# Patient Record
Sex: FEMALE | Race: WHITE | NOT HISPANIC OR LATINO | URBAN - METROPOLITAN AREA
[De-identification: names, ages, dates, MRNs, and addresses within clinical notes are randomized per-mention and may not be internally consistent; named-entity substitution may affect disease eponyms.]

---

## 2023-04-04 ENCOUNTER — TELEPHONE (OUTPATIENT)
Dept: PRIMARY CARE | Facility: CLINIC | Age: 73
End: 2023-04-04
Payer: MEDICARE

## 2023-04-04 NOTE — TELEPHONE ENCOUNTER
Pt called and states that she have another sinus infection with her ear popping, congestion,cheeks hurt, headache and no fever it started yesterday and want to know if you can do a vv tomorrow?

## 2023-04-05 ENCOUNTER — TELEMEDICINE (OUTPATIENT)
Dept: PRIMARY CARE | Facility: CLINIC | Age: 73
End: 2023-04-05
Payer: MEDICARE

## 2023-04-05 DIAGNOSIS — J32.8 OTHER CHRONIC SINUSITIS: Primary | ICD-10-CM

## 2023-04-05 PROBLEM — J30.9 ALLERGIC RHINITIS: Status: ACTIVE | Noted: 2023-04-05

## 2023-04-05 PROBLEM — J34.2 DEVIATED NASAL SEPTUM: Status: ACTIVE | Noted: 2023-04-05

## 2023-04-05 PROBLEM — M81.0 OSTEOPOROSIS: Status: ACTIVE | Noted: 2023-04-05

## 2023-04-05 PROBLEM — E78.5 HYPERLIPIDEMIA: Status: ACTIVE | Noted: 2023-04-05

## 2023-04-05 PROBLEM — R09.82 POST-NASAL DRAINAGE: Status: ACTIVE | Noted: 2023-04-05

## 2023-04-05 PROBLEM — R26.89 LIMP: Status: ACTIVE | Noted: 2023-04-05

## 2023-04-05 PROBLEM — S93.401A INVERSION SPRAIN OF RIGHT ANKLE: Status: ACTIVE | Noted: 2023-04-05

## 2023-04-05 PROBLEM — K64.9 HEMORRHOIDS: Status: ACTIVE | Noted: 2023-04-05

## 2023-04-05 PROBLEM — S92.353A FRACTURE OF FIFTH METATARSAL BONE: Status: ACTIVE | Noted: 2023-04-05

## 2023-04-05 PROBLEM — N81.6 RECTOCELE, FEMALE: Status: ACTIVE | Noted: 2023-04-05

## 2023-04-05 PROBLEM — J32.2 CHRONIC ETHMOIDAL SINUSITIS: Status: ACTIVE | Noted: 2023-04-05

## 2023-04-05 PROBLEM — R94.31 ABNORMAL EKG: Status: ACTIVE | Noted: 2023-04-05

## 2023-04-05 PROBLEM — J34.89 NASAL CAVITY MASS: Status: ACTIVE | Noted: 2023-04-05

## 2023-04-05 PROBLEM — J34.89 SINUS PRESSURE: Status: ACTIVE | Noted: 2023-04-05

## 2023-04-05 PROBLEM — N62 MACROMASTIA: Status: ACTIVE | Noted: 2023-04-05

## 2023-04-05 PROBLEM — M25.571 RIGHT ANKLE PAIN: Status: ACTIVE | Noted: 2023-04-05

## 2023-04-05 PROBLEM — E78.00 PURE HYPERCHOLESTEROLEMIA: Status: ACTIVE | Noted: 2023-04-05

## 2023-04-05 PROBLEM — M79.671 RIGHT FOOT PAIN: Status: ACTIVE | Noted: 2023-04-05

## 2023-04-05 PROBLEM — N36.2 URETHRAL CARUNCLE: Status: ACTIVE | Noted: 2023-04-05

## 2023-04-05 PROBLEM — J30.1 ALLERGIC RHINITIS DUE TO POLLEN: Status: ACTIVE | Noted: 2023-04-05

## 2023-04-05 PROBLEM — N95.2 ATROPHIC VAGINITIS: Status: ACTIVE | Noted: 2023-04-05

## 2023-04-05 PROCEDURE — 99213 OFFICE O/P EST LOW 20 MIN: CPT | Performed by: INTERNAL MEDICINE

## 2023-04-05 RX ORDER — AZITHROMYCIN 250 MG/1
TABLET, FILM COATED ORAL
Qty: 12 TABLET | Refills: 0 | Status: SHIPPED | OUTPATIENT
Start: 2023-04-05

## 2023-04-05 RX ORDER — ESTRADIOL 0.1 MG/G
CREAM VAGINAL
COMMUNITY
Start: 2022-05-24 | End: 2023-04-05 | Stop reason: ALTCHOICE

## 2023-04-05 ASSESSMENT — ENCOUNTER SYMPTOMS
DIARRHEA: 0
COUGH: 0
HEADACHES: 1
RHINORRHEA: 0
SORE THROAT: 1
NECK PAIN: 0
ABDOMINAL PAIN: 0
VOMITING: 0

## 2023-04-05 NOTE — PROGRESS NOTES
Answers submitted by the patient for this visit:  Ear Pain Questionnaire (Submitted on 4/5/2023)  Chief Complaint: Ear pain  Affected ear: both  Chronicity: new  Onset: in the past 7 days  Progression since onset: unchanged  Fever: no fever  Pain - numeric: 2/10  abdominal pain: No  ear discharge: No  rash: No  cough: No  headaches: Yes  rhinorrhea: No  diarrhea: No  hearing loss: No  sore throat: Yes  neck pain: No  vomiting: No

## 2023-04-05 NOTE — PROGRESS NOTES
Subjective   Patient ID: Taryn Brar is a 72 y.o. female who presents for Sinusitis and ears pain.    HPI   Symptoms of sinusitis. Did not get completely better last time, one month ago.  Then symptoms got worse a few days ago  Mucus is yellow  Hot and cold  Frontal headaches, and now right ear pressure too  No cp sob      negative otherwise    Objective   There were no vitals taken for this visit.  pt appears well.  No acute distress or resp distress  Patient is alert and orientated three.  mood and affection appropriate      Assessment/Plan   Diagnoses and all orders for this visit:  Other chronic sinusitis  -     azithromycin (Zithromax) 250 mg tablet; Take two po today then one po qd  Continue flnoase and robitussin  Follow up if not better

## 2023-08-23 ENCOUNTER — TELEPHONE (OUTPATIENT)
Dept: PRIMARY CARE | Facility: CLINIC | Age: 73
End: 2023-08-23
Payer: MEDICARE

## 2023-08-23 NOTE — TELEPHONE ENCOUNTER
Pt lvm requesting to cancel appt with Dr. Bonilla    Spoke with pt and gave her dr. Bonilla number

## 2023-10-27 ENCOUNTER — OFFICE VISIT (OUTPATIENT)
Dept: DERMATOLOGY | Facility: CLINIC | Age: 73
End: 2023-10-27
Payer: MEDICARE

## 2023-10-27 VITALS — HEART RATE: 80 BPM | SYSTOLIC BLOOD PRESSURE: 160 MMHG | DIASTOLIC BLOOD PRESSURE: 80 MMHG

## 2023-10-27 DIAGNOSIS — C44.311 BASAL CELL CARCINOMA (BCC) OF SKIN OF NOSE: ICD-10-CM

## 2023-10-27 PROCEDURE — 13151 CMPLX RPR E/N/E/L 1.1-2.5 CM: CPT | Performed by: DERMATOLOGY

## 2023-10-27 PROCEDURE — 17312 MOHS ADDL STAGE: CPT | Performed by: DERMATOLOGY

## 2023-10-27 PROCEDURE — 17311 MOHS 1 STAGE H/N/HF/G: CPT | Performed by: DERMATOLOGY

## 2023-10-27 PROCEDURE — 99214 OFFICE O/P EST MOD 30 MIN: CPT | Performed by: DERMATOLOGY

## 2023-10-27 PROCEDURE — 1126F AMNT PAIN NOTED NONE PRSNT: CPT | Performed by: DERMATOLOGY

## 2023-10-27 PROCEDURE — 1036F TOBACCO NON-USER: CPT | Performed by: DERMATOLOGY

## 2023-10-27 PROCEDURE — 1159F MED LIST DOCD IN RCRD: CPT | Performed by: DERMATOLOGY

## 2023-10-27 NOTE — PROGRESS NOTES
Office Visit Note  Date: 10/27/2023  Surgeon:  Vicky Mckinley MD  Office Location: [address]  [facility] [address]  Dept: [phone]  Dept Fax: [phone]  Referring Provider: Francis Nicole MD  [address] [place] [address] Dr Reji Rios [address], [place] [address]  [place] [id number]    Subjective   Taryn Brar is a 73 y.o. female who presents for the following: MOHS Surgery (Left Lateral Nasal Dorsum - Basal Cell Carcinoma)    According to the patient, the lesion has been presnt for approximately 1 month at the time of diagnosis.  The lesion is not causing symptoms.  The lesion has not been treated previously.    The patient does not have a pacemaker / defibrillator.    The patient is not on blood thinners.  The patient does not have a history of hepatitis B or C.  The patient does not have a history of HIV.    Review of Systems:  No other skin or systemic complaints other than what is documented elsewhere in the note.    MEDICAL HISTORY: clinically relevant history including significant past medical history, medications and allergies was reviewed and documented in Epic.    Objective   Well appearing patient in no apparent distress; mood and affect are within normal limits.  Vital signs: See record.  Noted on the Left Nasal Dorsum  Is a 0.5 x 0.5 cm scar    The patient confirmed the identified site.    Discussion:  The nature of the diagnosis was explained. The lesion is a skin cancer.  It has a risk of local growth and distant spread. The condition is associated with sun exposure.  Warning signs of non-melanoma skin cancer discussed. Patient was instructed to perform monthly self skin examination.  We recommended that the patient have regular full skin exams given an increased risk of subsequent skin cancers. The patient was instructed to use sun protective behaviors including use of broad spectrum sunscreens and sun protective clothing to reduce risk of skin cancers.       Risks, benefits, side effects of Mohs surgery were discussed with patient and the patient voiced understanding.  It was explained that even though the cure rate of Mohs is very high it is not 100%. Risks of surgery including but not limited to bleeding, infection, numbness, nerve damage, and scar were reviewed.  Discussion included wound care requirements, activity restrictions, likely scar outcome and time to heal.     After Mohs surgery, the defect may need to be repaired surgically and the scar may be longer than the original lesion.  Reconstruction options, risks, and benefits were reviewed including second intention healing, linear repair (4-1 ratio was explained), local flaps, skin grafts, cartilage grafts and interpolation flaps (the need for multiple surgeries was explained). Possible outcomes were reviewed including likely scar appearance, failure of flap survival, infection, bleeding and the need for revision surgery.     The pathology was reviewed, the photograph was reviewed, and the referring physician's note was reviewed.    Patient elected for Mohs surgery.

## 2023-10-27 NOTE — PROGRESS NOTES
Mohs Surgery Operative Note    Date of Surgery:  10/27/2023  Surgeon:  Vicky Mckinley MD  Office Location: [address]   [facility] [address]  [facility] [address]  Dept: [phone]  Dept Fax: [phone]  Referring Provider: Francis Nicole MD  [address] [place] [address]  [place] [address]      Assessment/Plan   Pre-procedure:   Obtained informed consent: written from patient  The surgical site was identified and confirmed with the patient.     Intra-operative:   Audible time out called at : 9:20 AM 10/27/23  by: Jana Villanueva LPN   Verified patient name, birthdate, site, specimen bottle label & requisition.    The planned procedure(s) was again reviewed with the patient. The risks of bleeding, infection, nerve damage and scarring were reviewed. Written authorization was obtained. The patient identity, surgical site, and planned procedure(s) were verified. The provider acted as both surgeon and pathologist.     Basal cell carcinoma (BCC) of skin of nose  Left Nasal Dorsum  Mohs surgery  Consent obtained: written    Universal Protocol:  Procedure explained and questions answered to patient or proxy's satisfaction: Yes    Test results available and properly labeled: Yes    Pathology report reviewed: Yes    External notes reviewed: Yes    Photo or diagram used for site identification: Yes    Site/side marked: Yes    Slide independently reviewed by Mohs surgeon: Yes    Immediately prior to procedure a time out was called: Yes    Patient identity confirmed: verbally with patient  Preparation: Patient was prepped and draped in usual sterile fashion      Anticoagulation:  Is the patient taking prescription anticoagulant and/or aspirin prescribed/recommended by a physician? No    Was the anticoagulation regimen changed prior to Mohs? No      Anesthesia:  Anesthesia method: local infiltration  Local anesthetic: lidocaine 2% WITH epi    Procedure Details:  Biopsy  accession number: [id number]  Date of biopsy: 7/20/2023  Pre-Op diagnosis: basal cell carcinoma  BCC subtype: nodular  Surgery side: left  Surgical site (from skin exam): Left Nasal Dorsum  Pre-operative length (cm): 0.5  Pre-operative width (cm): 0.5  Indications for Mohs surgery: anatomic location where tissue conservation is critical    Micrographic Surgery Details:  Post-operative length (cm): 0.8  Post-operative width (cm): 0.8  Number of Mohs stages: 2    Stage 1     Comments: The patient was brought into the operating room and placed in the procedure chair in the appropriate position.  The area positive by previous biopsy was identified and confirmed with the patient. The area of clinically obvious tumor was debulked using a curette and/or scalpel as needed. An incision was made following the Mohs approach through the skin. The specimen was taken to the lab, divided into 2 piece(s) and appropriately chromacoded and processed.  Tumor was seen on the lateral margins as indicated on the on the Mohs map.  Nodular basal cell carcinoma. Histologic examination revealed large tumor aggregates of atypical basaloid cells with peripheral palisading and tumoral clefting.      Tumor features identified on Mohs section: basal carcinoma      Depth of invasion: dermis    Stage 2     Comments: The area of positivity as noted on the Mohs map in the previous stage was identified and removed using the Mohs technique. The specimen was taken to the lab and appropriately chromacoded and processed in 1 piece(s).     Tumor features identified on Mohs section: no tumor identified  Depth of defect: subcutaneous fat    Patient tolerance of procedure: tolerated well, no immediate complications    Reconstruction:  Was the defect reconstructed? Yes    Was reconstruction performed by the same Mohs surgeon? Yes    Setting of reconstruction: outpatient office  When was reconstruction performed? same day  Type of reconstruction: linear  Linear  reconstruction: complex  Subcutaneous Layers (Deep Stitches)   Suture size:  5-0  Suture type:  Vicryl  Stitches:  Buried vertical mattress  Fine/surface layer approximation (top stitches)   Epidermal/Superficial suture size:  5-0  Epidermal/Superficial suture type:  Fast-absorbing gut  Stitches: simple running    Hemostasis achieved with: suture, pressure and electrodesiccation  Outcome: patient tolerated procedure well with no complications    Post-procedure details: wound care instructions given      Staff Communication: Dermatology Local Anesthesia: Site Location: Left Lateral Nasal Dorsum 2 % Lidocaine / Epinephrine - Amount: 3.0 cc    Complex Linear Repair - Wide Undermining:  Given the location and size of the defect, it was determined that a complex layered linear closure was required to restore normal anatomy and function. The repair was considered complex because extensive undermining was required and performed. The amount of undermining performed was greater than the maximum width of the defect as measured perpendicular to the closure line along at least one entire edge of the defect. Standing cutaneous cones were removed using Burow's triangles. The wound edges were brought into close approximation with buried vertical mattress sutures. The remainder of the wound was then closed with epidermal top sutures.    The final repair measured 1.5 cm                  Wound care was discussed, and the patient was given written post-operative wound care instructions.      The patient will follow up with Vicky Mckinley MD as needed for any post operative problems or concerns, and will follow up with their primary dermatologist as scheduled.

## 2024-01-07 ASSESSMENT — DERMATOLOGY QUALITY OF LIFE (QOL) ASSESSMENT
RATE HOW BOTHERED YOU ARE BY EFFECTS OF YOUR SKIN PROBLEMS ON YOUR ACTIVITIES (EG, GOING OUT, ACCOMPLISHING WHAT YOU WANT, WORK ACTIVITIES OR YOUR RELATIONSHIPS WITH OTHERS): 0 - NEVER BOTHERED
ARE THERE EXCLUSIONS OR EXCEPTIONS FOR THE QUALITY OF LIFE ASSESSMENT: NO
WHAT SINGLE SKIN CONDITION LISTED BELOW IS THE PATIENT ANSWERING THE QUALITY-OF-LIFE ASSESSMENT QUESTIONS ABOUT: NONE OF THE ABOVE
RATE HOW BOTHERED YOU ARE BY EFFECTS OF YOUR SKIN PROBLEMS ON YOUR ACTIVITIES (EG, GOING OUT, ACCOMPLISHING WHAT YOU WANT, WORK ACTIVITIES OR YOUR RELATIONSHIPS WITH OTHERS): 0 - NEVER BOTHERED
RATE HOW EMOTIONALLY BOTHERED YOU ARE BY YOUR SKIN PROBLEM (FOR EXAMPLE, WORRY, EMBARRASSMENT, FRUSTRATION): 0 - NEVER BOTHERED
RATE HOW BOTHERED YOU ARE BY SYMPTOMS OF YOUR SKIN PROBLEM (EG, ITCHING, STINGING BURNING, HURTING OR SKIN IRRITATION): 1
WHAT SINGLE SKIN CONDITION LISTED BELOW IS THE PATIENT ANSWERING THE QUALITY-OF-LIFE ASSESSMENT QUESTIONS ABOUT: NONE OF THE ABOVE
RATE HOW EMOTIONALLY BOTHERED YOU ARE BY YOUR SKIN PROBLEM (FOR EXAMPLE, WORRY, EMBARRASSMENT, FRUSTRATION): 0 - NEVER BOTHERED
RATE HOW BOTHERED YOU ARE BY SYMPTOMS OF YOUR SKIN PROBLEM (EG, ITCHING, STINGING BURNING, HURTING OR SKIN IRRITATION): 1

## 2024-01-07 ASSESSMENT — DERMATOLOGY PATIENT ASSESSMENT
DO YOU HAVE ANY NEW OR CHANGING LESIONS: YES
WHERE ARE THESE NEW OR CHANGING LESIONS LOCATED: R LOWER BACK

## 2024-01-07 ASSESSMENT — PATIENT GLOBAL ASSESSMENT (PGA): WHAT IS THE PGA: PATIENT GLOBAL ASSESSMENT:  1 - CLEAR

## 2024-01-09 NOTE — PROGRESS NOTES
"Subjective     Taryn Brar is a 73 y.o. female who presents for the following: Skin Exam.  She notes a raised, scaly, itchy bump on her right abdomen, which rubs on her clothing and itches, but has not changed in any other way, including in size or color, and does not hurt or bleed.  She denies any other new, changing, or concerning skin lesions; no bleeding, itching, or burning lesions.      Review of Systems:  No other skin or systemic complaints other than what is documented elsewhere in the note.    The following portions of the chart were reviewed this encounter and updated as appropriate:       Skin Cancer History  No skin cancer on file.    Specialty Problems    None      Past Dermatologic / Past Relevant Medical History:    - history of BCC on left lateral nasal dorsum diagnosed 7/19/23 s/p Mohs surgery by Dr. Mckinley 10/27/23  - AKs  - reported history of \"pre-cancers\" on [facility] nose [facility]  - no h/o atypical nevi or melanoma     Family History:    No family history of melanoma or skin cancer    Social History:    The patient and her , who is a patient in our office as well, moved to [place] from [place], [place], to live near their daughter and son-in-law, who is a patient in our office as well, and she works part-time as a  at the [employer] in [place]    Allergies:  Patient has no known allergies.    Current Medications / CAM's:    Current Outpatient Medications:     cholecalciferol, vitamin D3, (VITAMIN D3 ORAL), Take by mouth., Disp: , Rfl:     azithromycin (Zithromax) 250 mg tablet, Take two po today then one po qd, Disp: 12 tablet, Rfl: 0    metroNIDAZOLE (Metrogel) 1 % gel, Apply twice daily to affected areas of face, Disp: 60 g, Rfl: 11     Objective   Well appearing patient in no apparent distress; mood and affect are within normal limits.    A full examination was performed including scalp, face, eyes, ears, nose, " lips, neck, chest, axillae, abdomen, back, bilateral upper extremities, and bilateral lower extremities. All findings within normal limits unless otherwise noted below.    Assessment/Plan   1. Neoplasm of uncertain behavior of skin (2)  right lower back  2 cm pink plaque with fine scale and ulceration          Lesion biopsy  Type of biopsy: tangential    Informed consent: discussed and consent obtained    Timeout: patient name, date of birth, surgical site, and procedure verified    Procedure prep:  Patient was prepped and draped  Anesthesia: the lesion was anesthetized in a standard fashion    Anesthetic:  1% lidocaine w/ epinephrine 1-100,000 local infiltration  Instrument used: DermaBlade    Hemostasis achieved with: aluminum chloride    Outcome: patient tolerated procedure well    Post-procedure details: sterile dressing applied and wound care instructions given    Dressing type: petrolatum and bandage      Staff Communication: Dermatology Local Anesthesia: 1 % Lidocaine / Epinephrine - Amount:    Specimen 1 - Dermatopathology- DERM LAB  Differential Diagnosis: bcc v other  Check Margins Yes/No?:    Comments:    Dermpath Lab: Routine Histopathology (formalin-fixed tissue)    lef medial mid back  1.8 cm pink patch          Lesion biopsy  Type of biopsy: tangential    Informed consent: discussed and consent obtained    Timeout: patient name, date of birth, surgical site, and procedure verified    Procedure prep:  Patient was prepped and draped  Anesthesia: the lesion was anesthetized in a standard fashion    Anesthetic:  1% lidocaine w/ epinephrine 1-100,000 local infiltration  Instrument used: DermaBlade    Hemostasis achieved with: aluminum chloride    Outcome: patient tolerated procedure well    Post-procedure details: sterile dressing applied and wound care instructions given    Dressing type: petrolatum and bandage      Staff Communication: Dermatology Local Anesthesia: 1 % Lidocaine / Epinephrine -  Amount:    Specimen 2 - Dermatopathology- DERM LAB  Differential Diagnosis: bcc v other  Check Margins Yes/No?:    Comments:    Dermpath Lab: Routine Histopathology (formalin-fixed tissue)    2. Actinic keratosis (2)  Mid Tip of Nose (2)  Scattered on the patient's mid nasal dorsum and right temple, there are erythematous, gritty, scaly macules     Actinic Keratoses -mid nasal dorsum and right temple.  The pre-cancerous nature of these lesions and treatment options were discussed with the patient today.  At this time, we recommend treatment with liquid nitrogen cryotherapy.  The patient expressed understanding, is in agreement with this plan, and wishes to proceed with cryotherapy today.    Destr of lesion - Mid Tip of Nose  Complexity: simple    Destruction method: cryotherapy    Informed consent: discussed and consent obtained    Lesion destroyed using liquid nitrogen: Yes    Cryotherapy cycles:  1  Outcome: patient tolerated procedure well with no complications    Post-procedure details: wound care instructions given      3. Inflamed seborrheic keratosis  Right Abdomen (side) - Lower  On the patient's right lateral abdomen, there is a 1.5 cm erythematous and light brown-colored, hyperkeratotic, stuck-on appearing plaque with a surrounding rim of erythema    Inflamed Seborrheic Keratosis - right lateral abdomen.  The benign nature of this lesion was discussed with the patient today and reassurance provided.  Given the history the patient provides of frequent irritation and associated symptoms as well as its inflamed appearance on exam today, we offered to treat this lesion with liquid nitrogen cryotherapy.  The patient expressed understanding, is in agreement with this plan, and wishes to proceed with cryotherapy today.    Destr of lesion - Right Abdomen (side) - Lower  Complexity: simple    Destruction method: cryotherapy    Informed consent: discussed and consent obtained    Lesion destroyed using liquid  nitrogen: Yes    Cryotherapy cycles:  2  Outcome: patient tolerated procedure well with no complications    Post-procedure details: wound care instructions given      4. Rosacea  Right Buccal Cheek  On the patient's face, most prominent over the bilateral medial cheeks and nose, there is moderate underlying erythema with several overlying telangiectasias and a few scattered erythematous, inflammatory papules     Rosacea - flare on face; papulo-pustular type.  The chronic and intermittently flaring nature of this condition and treatment options were discussed extensively with the patient today.  At this time, we recommend topical therapy with MetroGel 1%, which the patient was instructed to apply twice daily to the affected areas of the face.  We also discussed the various triggers of rosacea with the patient today, especially sun exposure, and emphasized the importance of trigger avoidance, particularly sun avoidance and sun protection with daily sunscreen use.  The risks, benefits, and side effects of this medication were discussed.  The patient expressed understanding and is in agreement with this plan.    metroNIDAZOLE (Metrogel) 1 % gel - Right Buccal Cheek  Apply twice daily to affected areas of face    5. History of nonmelanoma skin cancer  Left Nasal Sidewall  On the patient's left lateral nasal dorsum, there is a well-healed scar with no evidence of recurrent growth on exam today.    History of basal cell carcinoma and actinic keratoses and photodamage.  There is no evidence of recurrence at the site of the patient's recently treated BCC on her left lateral nasal dorsum on exam today.  The signs and symptoms of skin cancer were reviewed and the patient was advised to practice sun protection and sun avoidance, use daily sunscreen, and perform regular self skin exams.  We will have the patient return to our office in 6 months, pending the above biopsy results, for routine follow-up and skin exam, and the  patient was instructed to call our office should the patient notice any new, changing, symptomatic, or otherwise concerning skin lesions before then.  The patient expressed understanding and is in agreement with this plan.    6. Hemangioma of skin  Scattered on the patient's face, neck, trunk, and extremities, there are multiple small, round, cherry red- to purplish-colored, symmetric, uniform, vascular-appearing macules and papules    Cherry Angiomas - the benign nature of these vascular lesions was discussed with the patient today and reassurance provided.  No treatment is medically indicated for these lesions at this time.    7. Melanocytic nevus, unspecified location  Scattered on the patient's face, neck, trunk, and extremities, there are multiple small, round- to oval-shaped, brown-pigmented and pink-colored, symmetric, uniform-appearing macules and dome-shaped papules    Clinically benign- to slightly atypical-appearing nevi - the clinically benign- to slightly atypical-appearing nature of the patient's nevi was discussed with the patient today.  None of the patient's nevi meet threshold for biopsy today.  We emphasized the importance of performing monthly self-skin exams using the ABCDs of monitoring moles, which were reviewed with the patient today, as well as the importance of sun avoidance and sun protection with daily sunscreen use.  The patient expressed understanding and is in agreement with this plan.    8. Lentigo  Multiple tan- to light brown-colored, round- to oval-shaped, symmetric and uniform-appearing macules and small patches consistent with lentigines scattered in sun-exposed areas.    Solar Lentigines and photodamage.  The clinically benign-appearing nature of these lesions and their relation to chronic sun exposure were discussed with the patient today and reassurance provided.  None of these lesions meet threshold for biopsy today, and thus no treatment is medically indicated for these  lesions at this time.  The signs and symptoms of skin cancer were reviewed and the patient was advised to practice sun protection and sun avoidance, use daily sunscreen, and perform regular self skin exams.  The patient was instructed to monitor these lesions for any changes, such as in size, shape, or color, or associated symptoms and to call our office to schedule a return visit for re-evaluation if any such changes or symptoms are noticed in the future.  The patient expressed understanding and is in agreement with this plan.    9. Diffuse photodamage of skin  Photodistributed  Diffuse photodamage with actinic changes with telangiectasia and mottled pigmentation in sun-exposed areas.    Photodamage.  The signs and symptoms of skin cancer were reviewed and the patient was advised to practice sun protection and sun avoidance, use daily sunscreen, and perform regular self skin exams.  Sun protection was discussed, including avoiding the mid-day sun, wearing a sunscreen with SPF at least 50, and stressing the need for reapplication of sunscreen and applying more than they think they need.    10. Seborrheic keratosis  Scattered on the patient's face, neck, trunk, and extremities, there are multiple tan- to light brown-colored, hyperkeratotic, stuck-on appearing papules and small plaques of varying size and shape    Seborrheic Keratoses - the benign nature of these lesions was discussed with the patient today and reassurance provided.  No treatment is medically indicated for these lesions at this time.      I saw and evaluated the patient. I personally obtained the key and critical portions of the history and physical exam or was physically present for key and critical portions performed by the resident/fellow. I reviewed the resident/fellow's documentation and discussed the patient with the resident/fellow. I agree with the resident/fellow's medical decision making as documented in the note.    Francis Nicole MD

## 2024-01-10 ENCOUNTER — OFFICE VISIT (OUTPATIENT)
Dept: DERMATOLOGY | Facility: CLINIC | Age: 74
End: 2024-01-10
Payer: MEDICARE

## 2024-01-10 DIAGNOSIS — D18.01 HEMANGIOMA OF SKIN: ICD-10-CM

## 2024-01-10 DIAGNOSIS — L71.9 ROSACEA: ICD-10-CM

## 2024-01-10 DIAGNOSIS — L57.0 ACTINIC KERATOSIS: ICD-10-CM

## 2024-01-10 DIAGNOSIS — D48.5 NEOPLASM OF UNCERTAIN BEHAVIOR OF SKIN: Primary | ICD-10-CM

## 2024-01-10 DIAGNOSIS — Z85.828 HISTORY OF NONMELANOMA SKIN CANCER: ICD-10-CM

## 2024-01-10 DIAGNOSIS — L57.8 DIFFUSE PHOTODAMAGE OF SKIN: ICD-10-CM

## 2024-01-10 DIAGNOSIS — L82.0 INFLAMED SEBORRHEIC KERATOSIS: ICD-10-CM

## 2024-01-10 DIAGNOSIS — D22.9 MELANOCYTIC NEVUS, UNSPECIFIED LOCATION: ICD-10-CM

## 2024-01-10 DIAGNOSIS — L81.4 LENTIGO: ICD-10-CM

## 2024-01-10 DIAGNOSIS — L82.1 SEBORRHEIC KERATOSIS: ICD-10-CM

## 2024-01-10 PROCEDURE — 99214 OFFICE O/P EST MOD 30 MIN: CPT | Performed by: DERMATOLOGY

## 2024-01-10 PROCEDURE — 1126F AMNT PAIN NOTED NONE PRSNT: CPT | Performed by: DERMATOLOGY

## 2024-01-10 PROCEDURE — 88305 TISSUE EXAM BY PATHOLOGIST: CPT | Performed by: DERMATOLOGY

## 2024-01-10 PROCEDURE — 17003 DESTRUCT PREMALG LES 2-14: CPT | Performed by: DERMATOLOGY

## 2024-01-10 PROCEDURE — 17000 DESTRUCT PREMALG LESION: CPT

## 2024-01-10 PROCEDURE — 11103 TANGNTL BX SKIN EA SEP/ADDL: CPT

## 2024-01-10 PROCEDURE — 17110 DESTRUCTION B9 LES UP TO 14: CPT

## 2024-01-10 PROCEDURE — 1159F MED LIST DOCD IN RCRD: CPT | Performed by: DERMATOLOGY

## 2024-01-10 PROCEDURE — 11102 TANGNTL BX SKIN SINGLE LES: CPT

## 2024-01-10 PROCEDURE — 1036F TOBACCO NON-USER: CPT | Performed by: DERMATOLOGY

## 2024-01-10 PROCEDURE — 88305 TISSUE EXAM BY PATHOLOGIST: CPT | Mod: TC,DER | Performed by: DERMATOLOGY

## 2024-01-12 LAB
LABORATORY COMMENT REPORT: NORMAL
PATH REPORT.FINAL DX SPEC: NORMAL
PATH REPORT.GROSS SPEC: NORMAL
PATH REPORT.MICROSCOPIC SPEC OTHER STN: NORMAL
PATH REPORT.RELEVANT HX SPEC: NORMAL
PATH REPORT.TOTAL CANCER: NORMAL

## 2024-01-13 RX ORDER — METRONIDAZOLE 10 MG/G
GEL TOPICAL
Qty: 60 G | Refills: 11 | Status: SHIPPED | OUTPATIENT
Start: 2024-01-13

## 2024-01-18 ENCOUNTER — TELEPHONE (OUTPATIENT)
Dept: DERMATOLOGY | Facility: CLINIC | Age: 74
End: 2024-01-18
Payer: MEDICARE

## 2024-01-26 ENCOUNTER — TELEPHONE (OUTPATIENT)
Dept: PRIMARY CARE | Facility: CLINIC | Age: 74
End: 2024-01-26
Payer: MEDICARE

## 2024-04-05 ENCOUNTER — PROCEDURE VISIT (OUTPATIENT)
Dept: DERMATOLOGY | Facility: CLINIC | Age: 74
End: 2024-04-05
Payer: MEDICARE

## 2024-04-05 DIAGNOSIS — C44.519 BASAL CELL CARCINOMA (BCC) OF SKIN OF OTHER PART OF TORSO: ICD-10-CM

## 2024-04-05 PROCEDURE — 99214 OFFICE O/P EST MOD 30 MIN: CPT | Performed by: DERMATOLOGY

## 2024-04-05 PROCEDURE — 17313 MOHS 1 STAGE T/A/L: CPT | Performed by: DERMATOLOGY

## 2024-04-05 PROCEDURE — 13101 CMPLX RPR TRUNK 2.6-7.5 CM: CPT | Performed by: DERMATOLOGY

## 2024-04-05 NOTE — PROGRESS NOTES
Office Visit Note  Date: 4/5/2024  Surgeon:  Vicky Mckinley MD  Office Location: [address]  [facility] [address]  Dept: [phone]  Dept Fax: [phone]  Referring Provider: Francis Nicole MD  [address] [place] [address]   Reji Gabriel [place], [facility] 125  [place],  [facility]22    Subjective   Taryn Brar is a 73 y.o. female who presents for the following: MOHS Surgery    According to the patient, the lesion has been present for approximately 6 months at the time of diagnosis.  The lesion is itchy.  The lesion has not been treated previously.    The patient does not have a pacemaker / defibrillator.  The patient does not have a heart valve / joint replacement.    The patient is not on blood thinners.  The patient does not have a history of hepatitis B or C.  The patient does not have a history of HIV.  The patient does have a history of immunosuppression (e.g. organ transplantation, malignancy, medications)    Review of Systems:  No other skin or systemic complaints other than what is documented elsewhere in the note.    MEDICAL HISTORY: clinically relevant history including significant past medical history, medications and allergies was reviewed and documented in Epic.    Objective   Well appearing patient in no apparent distress; mood and affect are within normal limits.  Vital signs: See record.  Noted on the Right Lower Back  Is a 1.5 x 2.5 cm scar      The patient confirmed the identified site.    Discussion:  The nature of the diagnosis was explained. The lesion is a skin cancer.  It has a risk of local growth and distant spread. The condition is associated with sun exposure.  Warning signs of non-melanoma skin cancer discussed. Patient was instructed to perform monthly self skin examination.  We recommended that the patient have regular full skin exams given an increased risk of subsequent skin cancers. The patient was instructed to use sun protective behaviors  including use of broad spectrum sunscreens and sun protective clothing to reduce risk of skin cancers.      Risks, benefits, side effects of Mohs surgery were discussed with patient and the patient voiced understanding.  It was explained that even though the cure rate of Mohs is very high it is not 100%. Risks of surgery including but not limited to bleeding, infection, numbness, nerve damage, and scar were reviewed.  Discussion included wound care requirements, activity restrictions, likely scar outcome and time to heal.     After Mohs surgery, the defect may need to be repaired surgically and the scar may be longer than the original lesion.  Reconstruction options, risks, and benefits were reviewed including second intention healing, linear repair (4-1 ratio was explained), local flaps, skin grafts, cartilage grafts and interpolation flaps (the need for multiple surgeries was explained). Possible outcomes were reviewed including likely scar appearance, failure of flap survival, infection, bleeding and the need for revision surgery.     The pathology was reviewed, the photograph was reviewed, and the referring physician's note was reviewed.    Patient elected for Mohs surgery.

## 2024-04-05 NOTE — PROGRESS NOTES
Mohs Surgery Operative Note    Date of Surgery:  4/5/2024  Surgeon:  Vicky Mckinley MD  Office Location: [address]   [facility] 125  [facility] [address]  Dept: [phone]  Dept Fax: [phone]  Referring Provider: Francis Nicole MD  [address] [place] [address] Dr Reji Iraheta, [place] [id number]      Assessment/Plan   Pre-procedure:   Obtained informed consent: written from patient  The surgical site was identified and confirmed with the patient.     Intra-operative:   Audible time out called at : 10:07AM 04/05/24  by: Sara Sarmiento RN   Verified patient name, birthdate, site, specimen bottle label & requisition.    The planned procedure(s) was again reviewed with the patient. The risks of bleeding, infection, nerve damage and scarring were reviewed. Written authorization was obtained. The patient identity, surgical site, and planned procedure(s) were verified. The provider acted as both surgeon and pathologist.     Basal cell carcinoma (BCC) of skin of other part of torso  Right Lower Back  Mohs surgery  Consent obtained: written    Universal Protocol:  Procedure explained and questions answered to patient or proxy's satisfaction: Yes    Test results available and properly labeled: Yes    Pathology report reviewed: Yes    External notes reviewed: Yes    Photo or diagram used for site identification: Yes    Site/side marked: Yes    Slide independently reviewed by Mohs surgeon: Yes    Immediately prior to procedure a time out was called: Yes    Patient identity confirmed: verbally with patient  Preparation: Patient was prepped and draped in usual sterile fashion      Anticoagulation:  Is the patient taking prescription anticoagulant and/or aspirin prescribed/recommended by a physician? No    Was the anticoagulation regimen changed prior to Mohs? No      Anesthesia:  Anesthesia method: local infiltration  Local anesthetic: lidocaine 1.5% WITH epi    Procedure  Details:  Biopsy accession number: [id number]  Date of biopsy: 1/10/2024  Pre-Op diagnosis: basal cell carcinoma  BCC subtype: superficial  Surgery side: right  Surgical site (from skin exam): Right Lower Back  Pre-operative length (cm): 1.5  Pre-operative width (cm): 2.5  Indications for Mohs surgery: tumor size greater than 2 cm  Previously treated? No      Micrographic Surgery Details:  Post-operative length (cm): 2  Post-operative width (cm): 3.7  Number of Mohs stages: 1    Stage 1  Comments: The patient was brought into the operating room and placed in the procedure chair in the appropriate position.  The area positive by previous biopsy was identified and confirmed with the patient. The area of clinically obvious tumor was debulked using a curette and/or scalpel as needed. An incision was made following the Mohs approach through the skin. The specimen was taken to the lab, divided into 2 piece(s) and appropriately chromacoded and processed.  Tumor features identified on Mohs section: no tumor identified  Depth of defect: subcutaneous fat    Patient tolerance of procedure: tolerated well, no immediate complications    Reconstruction:  Was the defect reconstructed? Yes    Was reconstruction performed by the same Mohs surgeon? Yes    Setting of reconstruction: outpatient office  When was reconstruction performed? same day  Type of reconstruction: linear  Linear reconstruction: complex  Length of linear repair (cm): 5.5  Subcutaneous Layers (Deep Stitches)   Suture size:  3-0  Suture type:  Vicryl  Stitches:  Buried vertical mattress  Fine/surface layer approximation (top stitches)   Epidermal/Superficial suture size:  5-0  Epidermal/Superficial suture type:  Fast-absorbing gut  Stitches: simple running    Hemostasis achieved with: electrodesiccation  Outcome: patient tolerated procedure well with no complications    Post-procedure details: sterile dressing applied and wound care instructions given    Dressing  type: Hypafix, pressure dressing, petrolatum and Telfa pad      Complex Linear Repair - Wide Undermining:  Given the location and size of the defect, it was determined that a complex layered linear closure was required to restore normal anatomy and function. The repair was considered complex because extensive undermining was required and performed. The amount of undermining performed was greater than the maximum width of the defect as measured perpendicular to the closure line along at least one entire edge of the defect. Standing cutaneous cones were removed using Burow's triangles. The wound edges were brought into close approximation with buried vertical mattress sutures. The remainder of the wound was then closed with epidermal top sutures.    The final repair measured 5.5 cm              Wound care was discussed, and the patient was given written post-operative wound care instructions.      The patient will follow up with Vicky Mckinley MD as needed for any post operative problems or concerns, and will follow up with their primary dermatologist as scheduled.

## 2024-04-05 NOTE — LETTER
April 5, 2024     Patient: Taryn Brar   YOB: 1950   Date of Visit: 4/5/2024       To Whom It May Concern:    It is my medical opinion that Taryn Brar may return to light duty immediately with the following restrictions: No lifting, pushing, or pulling anything greater than 10lbs, including bagging groceries for 1 week .    If you have any questions or concerns, please don't hesitate to call.         Sincerely,        Vicky Mckinley MD    CC: No Recipients

## 2024-05-10 ENCOUNTER — APPOINTMENT (OUTPATIENT)
Dept: DERMATOLOGY | Facility: CLINIC | Age: 74
End: 2024-05-10
Payer: MEDICARE

## 2024-05-31 ENCOUNTER — APPOINTMENT (OUTPATIENT)
Dept: DERMATOLOGY | Facility: CLINIC | Age: 74
End: 2024-05-31
Payer: MEDICARE

## 2024-06-14 ENCOUNTER — APPOINTMENT (OUTPATIENT)
Dept: DERMATOLOGY | Facility: CLINIC | Age: 74
End: 2024-06-14
Payer: MEDICARE

## 2024-06-14 DIAGNOSIS — C44.519 BASAL CELL CARCINOMA (BCC) OF SKIN OF OTHER PART OF TORSO: ICD-10-CM

## 2024-06-14 PROCEDURE — 12032 INTMD RPR S/A/T/EXT 2.6-7.5: CPT | Performed by: DERMATOLOGY

## 2024-06-14 PROCEDURE — 11603 EXC TR-EXT MAL+MARG 2.1-3 CM: CPT | Performed by: DERMATOLOGY

## 2024-06-14 RX ORDER — AMLODIPINE BESYLATE 2.5 MG/1
2.5 TABLET ORAL
COMMUNITY
Start: 2024-04-25

## 2024-06-14 NOTE — LETTER
June 14, 2024     Patient: Taryn Brar   YOB: 1950   Date of Visit: 6/14/2024       To Whom It May Concern:    It is my medical opinion that Taryn Brar may return to light duty immediately with the following restrictions: No lifting >5lbs for one week .    If you have any questions or concerns, please don't hesitate to call.         Sincerely,        Vicky Mckinley MD    CC: No Recipients

## 2024-06-14 NOTE — PROGRESS NOTES
Excision Operative Note    Date of Surgery:  6/14/2024  Surgeon:  Vicky Mckinley MD  Office Location: [address]  [facility] [address]  Dept: [phone]  Dept Fax: [phone]  Referring Provider: Francis Nicole MD  [address] [place] [address]   [facility] 125  [place] [id number]    Subjective   Taryn Brar is a 74 y.o. female who presents for the following: Excision (Left Medial Mid Back - BCC) for basal cell carcinoma.    According to the patient, the lesion has been present for approximately 1 month at the time of diagnosis.  The lesion is not causing symptoms.  The lesion has not been treated previously.    The patient does not have a pacemaker / defibrillator.  The patient does not have a heart valve / joint replacement.    The patient is not on blood thinners.   The patient does not have a history of hepatitis B or C.  The patient does not have a history of HIV.  The patient does not have a history of immunosuppression (e.g. organ transplantation, malignancy, medications)    Is it okay to leave a phone message with results? Y  Who else may we leave results with: (name, relationship) N/A    The following portions of the chart were reviewed this encounter and updated as appropriate:         Assessment/Plan   Pre-procedure:   Obtained informed consent: written from patient  The surgical site was identified and confirmed with the patient.     Intra-operative:   Audible time out called at : 1:30 PM 06/14/24  by: Lina Pike RN   Verified patient name, birthdate, site, specimen bottle label & requisition.    The planned procedure(s) was again reviewed with the patient. The risks of bleeding, infection, nerve damage and scarring were reviewed. The patient identity, surgical site, and planned procedure(s) were verified.     Biopsy Accession Number: [id number]  Basal cell carcinoma (BCC) of skin of other part of torso  Left Lower Back    Skin  excision    Lesion length (cm):  1.5  Lesion width (cm):  1.1  Margin per side (cm):  0.5  Total excision diameter (cm):  2.5  Informed consent: discussed and consent obtained    Timeout: patient name, date of birth, surgical site, and procedure verified    Procedure prep:  Patient prepped in sterile fashion  Anesthesia: the lesion was anesthetized in a standard fashion    Anesthetic:  1% lidocaine w/ epinephrine 1-100,000 local infiltration  Instrument used: #15 blade    Hemostasis achieved with: electrodesiccation    Outcome: patient tolerated procedure well with no complications    Post-procedure details: sterile dressing applied and wound care instructions given    Dressing type: pressure dressing, Telfa pad and Hypafix    Additional details:  The nature of the diagnosis was explained. The lesion is a skin cancer.  It is locally aggressive but has a very low to non-existent risk of spreading.  The condition is associated with sun exposure.  Warning signs of non-melanoma skin cancer discussed. Patient was instructed to perform monthly self skin examination.  We recommended that the patient have regular full skin exams given an increased risk of subsequent skin cancers. The patient was instructed to use sun protective behaviors including use of broad spectrum sunscreens and sun protective clothing to reduce risk of skin cancers.  Excision was discussed with the patient. The risks, benefits and potential adverse effects were reviewed. Discussion included but was not limited to the cure rate, relative cost, wound care requirements, activity restrictions, likely scar outcome and time to heal were reviewed. It was explained that the scar would be longer than the original lesion.  The patient elected to proceed with exicision today.    Skin repair  Complexity:  Intermediate  Final length (cm):  6  Informed consent: discussed and consent obtained    Timeout: patient name, date of birth, surgical site, and procedure  verified    Procedure prep:  Patient prepped in sterile fashion  Anesthesia: the lesion was anesthetized in a standard fashion    Anesthetic:  1% lidocaine w/ epinephrine 1-100,000 local infiltration  Reason for type of repair: reduce tension to allow closure    Undermining: edges undermined    Subcutaneous layers (deep stitches):   Suture size:  3-0  Suture type: Vicryl (polyglactin 910)    Stitches:  Buried vertical mattress  Fine/surface layer approximation (top stitches):   Suture size:  5-0  Suture type: fast-absorbing plain gut    Stitches: simple running    Hemostasis achieved with: electrodesiccation  Outcome: patient tolerated procedure well with no complications    Post-procedure details: sterile dressing applied and wound care instructions given    Dressing type: pressure dressing      Specimen 1 - Dermatopathology- DERM LAB  Differential Diagnosis: BCC  Check Margins Yes  Comments:    Dermpath Lab: Routine Histopathology (formalin-fixed tissue)      Intermediate Linear Repair:  Given the location and size of the defect, it was determined that an intermediate layered linear closure was required to restore normal anatomy and function. The repair is an intermediate closure as two layers of sutures were required. The defect was undermined extensively at the level of the subcutaneous plane. Standing cutaneous cones were removed using Burow's triangles. The wound edges were brought into close approximation with buried vertical mattress sutures. The remainder of the wound was then closed with epidermal top sutures.    The final repair measured 6 cm      Wound care was discussed, and the patient was given written post-operative wound care instructions.      The patient will follow up with Vicky Mckinley MD as needed for any post operative problems or concerns, and will follow up with their primary dermatologist as scheduled.

## 2024-07-03 ENCOUNTER — TELEPHONE (OUTPATIENT)
Dept: DERMATOLOGY | Facility: CLINIC | Age: 74
End: 2024-07-03

## 2024-07-03 ENCOUNTER — APPOINTMENT (OUTPATIENT)
Dept: DERMATOLOGY | Facility: CLINIC | Age: 74
End: 2024-07-03
Payer: MEDICARE

## 2024-07-03 DIAGNOSIS — D18.01 HEMANGIOMA OF SKIN: ICD-10-CM

## 2024-07-03 DIAGNOSIS — L71.9 ROSACEA: ICD-10-CM

## 2024-07-03 DIAGNOSIS — L57.0 ACTINIC KERATOSIS: ICD-10-CM

## 2024-07-03 DIAGNOSIS — D22.9 MELANOCYTIC NEVUS, UNSPECIFIED LOCATION: ICD-10-CM

## 2024-07-03 DIAGNOSIS — Z12.83 ENCOUNTER FOR SCREENING FOR MALIGNANT NEOPLASM OF SKIN: ICD-10-CM

## 2024-07-03 DIAGNOSIS — Z85.828 HISTORY OF NONMELANOMA SKIN CANCER: ICD-10-CM

## 2024-07-03 DIAGNOSIS — L57.8 DIFFUSE PHOTODAMAGE OF SKIN: ICD-10-CM

## 2024-07-03 DIAGNOSIS — L82.1 SEBORRHEIC KERATOSIS: ICD-10-CM

## 2024-07-03 DIAGNOSIS — D48.5 NEOPLASM OF UNCERTAIN BEHAVIOR OF SKIN: Primary | ICD-10-CM

## 2024-07-03 RX ORDER — AZELASTINE 1 MG/ML
SPRAY, METERED NASAL
COMMUNITY

## 2024-07-03 RX ORDER — METRONIDAZOLE 10 MG/G
GEL TOPICAL
Qty: 60 G | Refills: 11 | Status: SHIPPED | OUTPATIENT
Start: 2024-07-03

## 2024-07-03 ASSESSMENT — DERMATOLOGY PATIENT ASSESSMENT
DO YOU HAVE ANY NEW OR CHANGING LESIONS: NO
ARE YOU TRYING TO GET PREGNANT: NO
DO YOU USE A TANNING BED: NO
DO YOU HAVE IRREGULAR MENSTRUAL CYCLES: NO
DO YOU USE SUNSCREEN: OCCASIONALLY
ARE YOU ON BIRTH CONTROL: NO

## 2024-07-03 ASSESSMENT — DERMATOLOGY QUALITY OF LIFE (QOL) ASSESSMENT: ARE THERE EXCLUSIONS OR EXCEPTIONS FOR THE QUALITY OF LIFE ASSESSMENT: NO

## 2024-07-03 NOTE — PROGRESS NOTES
"Subjective     Taryn Brar is a 74 y.o. female who presents for the following: Skin Exam.  She notes recent pimple breakouts on her face.  She denies any new, changing, or concerning skin lesions since her last visit; no bleeding, itching, or burning lesions.      Review of Systems:  No other skin or systemic complaints other than what is documented elsewhere in the note.    The following portions of the chart were reviewed this encounter and updated as appropriate:       Skin Cancer History  Biopsy Date Type Location Status   1/10/24 BCC right lower back Treatment Complete  7/3/24   1/10/24 BCC lef medial mid back Treatment Complete  7/3/24     Specialty Problems    None      Past Dermatologic / Past Relevant Medical History:    - history of 2 BCCs on right lower back and left medial mid back both diagnosed on 1/10/24 s/p Mohs surgery by Dr. Mckinley on 4/5/24 and wide local excision with 5-mm margins by Dr. Mckinley on 6/14/24, respectively  - BCC on left lateral nasal dorsum diagnosed 7/19/23 s/p Mohs surgery by Dr. Mckinley 10/27/23  - AKs  - reported history of \"pre-cancers\" on her nose treated with liquid nitrogen cryotherapy by [facility] Dermatologist in the past  - no h/o atypical nevi or melanoma     Family History:    No family history of melanoma or skin cancer    Social History:    The patient and her , who is a patient in our office as well, moved to [place] from [place], [place], to live near their daughter and son-in-law, who is a patient in our office as well, and she works part-time as a  at the [employer] in [place]    Allergies:  Patient has no known allergies.    Current Medications / CAM's:    Current Outpatient Medications:     amLODIPine (Norvasc) 2.5 mg tablet, Take 1 tablet (2.5 mg) by mouth once daily., Disp: , Rfl:     azelastine (Astelin) 137 mcg (0.1 %) nasal spray, , Disp: , Rfl:     azithromycin (Zithromax) 250 mg tablet, Take two po today then one po qd, Disp: 12 tablet, Rfl: 0    " cholecalciferol, vitamin D3, (VITAMIN D3 ORAL), Take by mouth., Disp: , Rfl:     metroNIDAZOLE (Metrogel) 1 % gel, Apply twice daily to affected areas of face, Disp: 60 g, Rfl: 11     Objective   Well appearing patient in no apparent distress; mood and affect are within normal limits.    A full examination was performed including scalp, face, eyes, ears, nose, lips, neck, chest, axillae, abdomen, back, bilateral upper extremities, and bilateral lower extremities. All findings within normal limits unless otherwise noted below.    Assessment/Plan   1. Neoplasm of uncertain behavior of skin  left medial upper back  1 cm pink papule with telangiectasias          Lesion biopsy  Type of biopsy: tangential    Informed consent: discussed and consent obtained    Timeout: patient name, date of birth, surgical site, and procedure verified    Procedure prep:  Patient was prepped and draped  Anesthesia: the lesion was anesthetized in a standard fashion    Anesthetic:  1% lidocaine w/ epinephrine 1-100,000 local infiltration  Instrument used: DermaBlade    Hemostasis achieved with: aluminum chloride    Outcome: patient tolerated procedure well    Post-procedure details: wound care instructions given      Specimen 1 - Dermatopathology- DERM LAB  Differential Diagnosis: blk v bcc  Check Margins Yes/No?:    Comments:    Dermpath Lab: Routine Histopathology (formalin-fixed tissue)    2. Actinic keratosis (2)  Left Buccal Cheek, Right Ala Nasi  On her right nasal dorsum and left lower cheek, there are 2 erythematous, gritty, scaly macules     Actinic Keratoses - right nasal dorsum and left lower cheek.  The pre-cancerous nature of these lesions and treatment options were discussed with the patient today.  At this time, we recommend treatment with liquid nitrogen cryotherapy.  The patient expressed understanding, is in agreement with this plan, and wishes to proceed with cryotherapy today.    Destr of lesion - Left Buccal Cheek, Right  Kaylan Nasnico  Complexity: simple    Destruction method: cryotherapy    Informed consent: discussed and consent obtained    Lesion destroyed using liquid nitrogen: Yes    Cryotherapy cycles:  1  Outcome: patient tolerated procedure well with no complications    Post-procedure details: wound care instructions given      3. Rosacea  Left Malar Cheek  On the patient's face, most prominent over the bilateral medial cheeks and nose, there is moderate underlying erythema with several overlying telangiectasias and a few scattered erythematous, inflammatory papules     Rosacea -flare on face; papulo-pustular type.  The chronic and intermittently flaring nature of this condition and treatment options were discussed extensively with the patient today.  At this time, we recommend topical therapy with MetroGel 1%, which the patient was instructed to apply twice daily to the affected areas of the face.  We also discussed the various triggers of rosacea with the patient today, especially sun exposure, and emphasized the importance of trigger avoidance, particularly sun avoidance and sun protection with daily sunscreen use.  The risks, benefits, and side effects of this medication were discussed.  The patient expressed understanding and is in agreement with this plan.    Related Medications  metroNIDAZOLE (Metrogel) 1 % gel  Apply twice daily to affected areas of face    4. Melanocytic nevus, unspecified location  Scattered on the patient's face, neck, trunk, and extremities, there are several small, round- to oval-shaped, brown-pigmented and pink-colored, symmetric, uniform-appearing macules and dome-shaped papules    Clinically benign- to slightly atypical-appearing nevi - the clinically benign- to slightly atypical-appearing nature of the patient's nevi was discussed with the patient today.  None of the patient's nevi meet threshold for biopsy today.  We emphasized the importance of performing monthly self-skin exams using the ABCDs  of monitoring moles, which were reviewed with the patient today, as well as the importance of sun avoidance and sun protection with daily sunscreen use.  The patient expressed understanding and is in agreement with this plan.    5. Seborrheic keratosis  Scattered on the patient's face, neck, trunk, and extremities, there are multiple tan- to light brown-colored, hyperkeratotic, stuck-on appearing papules of varying size and shape    Seborrheic Keratoses - the benign nature of these lesions was discussed with the patient today and reassurance provided.  No treatment is medically indicated for these lesions at this time.    6. Hemangioma of skin  Scattered on the patient's face, neck, trunk, and extremities, there are multiple small, round, cherry red- to purplish-colored, symmetric, uniform, vascular-appearing macules and papules    Cherry Angiomas - the benign nature of these vascular lesions was discussed with the patient today and reassurance provided.  No treatment is medically indicated for these lesions at this time.    7. History of nonmelanoma skin cancer (3)  Left Lower Back, Left Nasal Sidewall, Right Lower Back  On the patient's left lateral nasal dorsum, right lower back, and left medial mid back, there are well-healed scars with no evidence of recurrent growth on exam today.    History of basal cell carcinomas and actinic keratoses and photodamage.  There is no evidence of recurrence at the sites of the patient's previously treated BCCs on exam today.  The signs and symptoms of skin cancer were reviewed and the patient was advised to practice sun protection and sun avoidance, use daily sunscreen, and perform regular self skin exams.  We will have the patient return to our office in 6 months, pending the above biopsy result, for routine follow-up and skin exam, and the patient was instructed to call our office should the patient notice any new, changing, symptomatic, or otherwise concerning skin lesions  before then.  The patient expressed understanding and is in agreement with this plan.    8. Diffuse photodamage of skin  Photodistributed  Diffuse photodamage with actinic changes with telangiectasia and mottled pigmentation in sun-exposed areas.    Photodamage.  The signs and symptoms of skin cancer were reviewed and the patient was advised to practice sun protection and sun avoidance, use daily sunscreen, and perform regular self skin exams.  Sun protection was discussed, including avoiding the mid-day sun, wearing a sunscreen with SPF at least 50, and stressing the need for reapplication of sunscreen and applying more than they think they need.    9. Encounter for screening for malignant neoplasm of skin        Seen and discussed with attending physician Dr. Bonnie Gonzalez MD, PhD  Resident, Dermatology      I saw and evaluated the patient. I personally obtained the key and critical portions of the history and physical exam or was physically present for key and critical portions performed by the resident/fellow. I reviewed the resident/fellow's documentation and discussed the patient with the resident/fellow. I agree with the resident/fellow's medical decision making as documented in the note.    Francis Nicole MD     none

## 2024-07-29 ENCOUNTER — APPOINTMENT (OUTPATIENT)
Dept: PRIMARY CARE | Facility: CLINIC | Age: 74
End: 2024-07-29
Payer: MEDICARE

## 2024-07-29 VITALS
HEART RATE: 88 BPM | RESPIRATION RATE: 12 BRPM | HEIGHT: 65 IN | DIASTOLIC BLOOD PRESSURE: 84 MMHG | SYSTOLIC BLOOD PRESSURE: 134 MMHG | WEIGHT: 144 LBS | OXYGEN SATURATION: 99 % | BODY MASS INDEX: 23.99 KG/M2

## 2024-07-29 DIAGNOSIS — I10 BENIGN HYPERTENSION: Primary | ICD-10-CM

## 2024-07-29 DIAGNOSIS — J32.2 CHRONIC ETHMOIDAL SINUSITIS: ICD-10-CM

## 2024-07-29 DIAGNOSIS — J34.2 DEVIATED NASAL SEPTUM: ICD-10-CM

## 2024-07-29 DIAGNOSIS — R51.9 CHRONIC NONINTRACTABLE HEADACHE, UNSPECIFIED HEADACHE TYPE: ICD-10-CM

## 2024-07-29 DIAGNOSIS — Z00.00 HEALTHCARE MAINTENANCE: ICD-10-CM

## 2024-07-29 DIAGNOSIS — E78.00 PURE HYPERCHOLESTEROLEMIA: ICD-10-CM

## 2024-07-29 DIAGNOSIS — R42 DIZZINESS: ICD-10-CM

## 2024-07-29 DIAGNOSIS — G89.29 CHRONIC NONINTRACTABLE HEADACHE, UNSPECIFIED HEADACHE TYPE: ICD-10-CM

## 2024-07-29 PROBLEM — R52 GENERALIZED PAIN: Status: ACTIVE | Noted: 2024-07-29

## 2024-07-29 PROBLEM — D22.70 MELANOCYTIC NEVI OF UNSPECIFIED LOWER LIMB, INCLUDING HIP: Status: ACTIVE | Noted: 2023-08-04

## 2024-07-29 PROBLEM — D22.5 MELANOCYTIC NEVI OF TRUNK: Status: ACTIVE | Noted: 2017-12-18

## 2024-07-29 PROBLEM — D48.5 NEOPLASM OF UNCERTAIN BEHAVIOR OF SKIN: Status: ACTIVE | Noted: 2019-07-17

## 2024-07-29 PROBLEM — L82.0 INFLAMED SEBORRHEIC KERATOSIS: Status: ACTIVE | Noted: 2019-01-22

## 2024-07-29 PROBLEM — C44.311 BASAL CELL CARCINOMA (BCC) OF SKIN OF NOSE: Status: ACTIVE | Noted: 2024-07-29

## 2024-07-29 PROBLEM — D22.4 MELANOCYTIC NEVI OF SCALP AND NECK: Status: ACTIVE | Noted: 2022-02-02

## 2024-07-29 PROCEDURE — 1160F RVW MEDS BY RX/DR IN RCRD: CPT | Performed by: FAMILY MEDICINE

## 2024-07-29 PROCEDURE — 1123F ACP DISCUSS/DSCN MKR DOCD: CPT | Performed by: FAMILY MEDICINE

## 2024-07-29 PROCEDURE — 99213 OFFICE O/P EST LOW 20 MIN: CPT | Performed by: FAMILY MEDICINE

## 2024-07-29 PROCEDURE — 3079F DIAST BP 80-89 MM HG: CPT | Performed by: FAMILY MEDICINE

## 2024-07-29 PROCEDURE — 3075F SYST BP GE 130 - 139MM HG: CPT | Performed by: FAMILY MEDICINE

## 2024-07-29 PROCEDURE — 1159F MED LIST DOCD IN RCRD: CPT | Performed by: FAMILY MEDICINE

## 2024-07-29 PROCEDURE — 1036F TOBACCO NON-USER: CPT | Performed by: FAMILY MEDICINE

## 2024-07-29 PROCEDURE — 3008F BODY MASS INDEX DOCD: CPT | Performed by: FAMILY MEDICINE

## 2024-07-29 RX ORDER — ENALAPRIL MALEATE 2.5 MG/1
2.5 TABLET ORAL DAILY
Qty: 90 TABLET | Refills: 1 | Status: SHIPPED | OUTPATIENT
Start: 2024-07-29 | End: 2025-07-29

## 2024-07-29 RX ORDER — CEPHALEXIN 500 MG/1
1 CAPSULE ORAL
COMMUNITY
Start: 2024-07-19

## 2024-07-29 ASSESSMENT — ENCOUNTER SYMPTOMS
SLEEP DISTURBANCE: 0
ARTHRALGIAS: 0
NERVOUS/ANXIOUS: 0
ADENOPATHY: 0
SINUS PRESSURE: 0
WHEEZING: 0
ABDOMINAL PAIN: 0
MYALGIAS: 0
APPETITE CHANGE: 0
DYSURIA: 0
BRUISES/BLEEDS EASILY: 0
DYSPHORIC MOOD: 0
HEADACHES: 0
FATIGUE: 0
CHILLS: 0
SHORTNESS OF BREATH: 0
NAUSEA: 0
FEVER: 0
DIFFICULTY URINATING: 0
ABDOMINAL DISTENTION: 0
LIGHT-HEADEDNESS: 0
CHEST TIGHTNESS: 0
DIARRHEA: 0

## 2024-07-29 NOTE — PROGRESS NOTES
"Subjective   Patient ID: Taryn Brar is a 74 y.o. female who presents for New Patient Visit.  Pt was seeing Dr Banks, here to establish care. She  has chronic HTN.  Pt is taking no medication, stopped Amlo earlier this year due to dizziness. Tolerating well.  Exercising 6-7 days per week   Low sodium diet is usually being followed.   Is monitoring home blood pressures. Readings range 130s/80s.  Denies vision changes or CP.     For hx rosacea and BCC sees Dr Nicole every 6 months. Doing well on Metrogel.    For allergies and chronic sinusiitis she is using Astelin nasal spray, still has intermittent HA and dizziness. She has never seen ENT for these issues.             Review of Systems   Constitutional:  Negative for appetite change, chills, fatigue and fever.   HENT:  Negative for congestion, ear pain and sinus pressure.    Eyes:  Negative for visual disturbance.   Respiratory:  Negative for chest tightness, shortness of breath and wheezing.    Cardiovascular:  Negative for chest pain.   Gastrointestinal:  Negative for abdominal distention, abdominal pain, diarrhea and nausea.   Genitourinary:  Negative for difficulty urinating, dysuria and pelvic pain.   Musculoskeletal:  Negative for arthralgias and myalgias.   Skin:  Negative for rash.   Allergic/Immunologic: Negative for immunocompromised state.   Neurological:  Negative for light-headedness and headaches.   Hematological:  Negative for adenopathy. Does not bruise/bleed easily.   Psychiatric/Behavioral:  Negative for dysphoric mood and sleep disturbance. The patient is not nervous/anxious.        Objective   /84   Pulse 88   Resp 12   Ht 1.651 m (5' 5\")   Wt 65.3 kg (144 lb)   SpO2 99%   BMI 23.96 kg/m²    Physical Exam  Constitutional:       General: She is not in acute distress.     Appearance: Normal appearance.   Cardiovascular:      Rate and Rhythm: Normal rate and regular rhythm.      Heart sounds: Normal heart sounds. No murmur " heard.  Pulmonary:      Effort: Pulmonary effort is normal.      Breath sounds: Normal breath sounds.   Abdominal:      Palpations: Abdomen is soft.      Tenderness: There is no abdominal tenderness.   Neurological:      Mental Status: She is alert.   Psychiatric:         Mood and Affect: Mood normal.         Judgment: Judgment normal.           Assessment/Plan   Diagnoses and all orders for this visit:  Benign hypertension - didn't do well with Amlodipine, will start low dose Enalapril and monitor. Recheck labs in 1 month.  Pure hypercholesterolemia - recheck with labs  Chronic ethmoidal sinusitis/Deviated nasal septum/Chronic headache/Dizziness - recurrent, referring to ENT. Continue Astelin seasonally for allergies.   Hx BCC/Rosacea - continue to monitor with Dr Nicole    Follow up in November, 30min for physical

## 2024-07-29 NOTE — PROGRESS NOTES
"Subjective   Patient ID: Taryn Brar is a 74 y.o. female who presents for New Patient Visit.    HPI     Review of Systems    Objective   /84   Pulse 88   Resp 12   Ht 1.651 m (5' 5\")   Wt 65.3 kg (144 lb)   SpO2 99%   BMI 23.96 kg/m²     Physical Exam    Assessment/Plan          "

## 2024-07-30 ENCOUNTER — LAB (OUTPATIENT)
Dept: LAB | Facility: LAB | Age: 74
End: 2024-07-30
Payer: MEDICARE

## 2024-07-30 DIAGNOSIS — Z00.00 HEALTHCARE MAINTENANCE: ICD-10-CM

## 2024-07-30 DIAGNOSIS — I10 BENIGN HYPERTENSION: ICD-10-CM

## 2024-07-30 DIAGNOSIS — E78.00 PURE HYPERCHOLESTEROLEMIA: ICD-10-CM

## 2024-07-30 LAB
ALBUMIN SERPL BCP-MCNC: 4 G/DL (ref 3.4–5)
ALP SERPL-CCNC: 70 U/L (ref 33–136)
ALT SERPL W P-5'-P-CCNC: 14 U/L (ref 7–45)
ANION GAP SERPL CALC-SCNC: 15 MMOL/L
APPEARANCE UR: CLEAR
AST SERPL W P-5'-P-CCNC: 19 U/L (ref 9–39)
BILIRUB SERPL-MCNC: 0.6 MG/DL (ref 0–1.2)
BILIRUB UR STRIP.AUTO-MCNC: NEGATIVE MG/DL
BUN SERPL-MCNC: 18 MG/DL (ref 6–23)
CALCIUM SERPL-MCNC: 9.8 MG/DL (ref 8.6–10.6)
CHLORIDE SERPL-SCNC: 105 MMOL/L (ref 98–107)
CHOLEST SERPL-MCNC: 218 MG/DL (ref 0–199)
CHOLESTEROL/HDL RATIO: 4.1
CO2 SERPL-SCNC: 26 MMOL/L (ref 21–32)
COLOR UR: ABNORMAL
CREAT SERPL-MCNC: 0.78 MG/DL (ref 0.5–1.05)
EGFRCR SERPLBLD CKD-EPI 2021: 80 ML/MIN/1.73M*2
ERYTHROCYTE [DISTWIDTH] IN BLOOD BY AUTOMATED COUNT: 13.4 % (ref 11.5–14.5)
GLUCOSE SERPL-MCNC: 92 MG/DL (ref 74–99)
GLUCOSE UR STRIP.AUTO-MCNC: NORMAL MG/DL
HCT VFR BLD AUTO: 39.8 % (ref 36–46)
HCV AB SER QL: NONREACTIVE
HDLC SERPL-MCNC: 53.7 MG/DL
HGB BLD-MCNC: 12.3 G/DL (ref 12–16)
HOLD SPECIMEN: NORMAL
HYALINE CASTS #/AREA URNS AUTO: ABNORMAL /LPF
KETONES UR STRIP.AUTO-MCNC: NEGATIVE MG/DL
LDLC SERPL CALC-MCNC: 130 MG/DL
LEUKOCYTE ESTERASE UR QL STRIP.AUTO: ABNORMAL
MCH RBC QN AUTO: 27.1 PG (ref 26–34)
MCHC RBC AUTO-ENTMCNC: 30.9 G/DL (ref 32–36)
MCV RBC AUTO: 88 FL (ref 80–100)
MUCOUS THREADS #/AREA URNS AUTO: ABNORMAL /LPF
NITRITE UR QL STRIP.AUTO: NEGATIVE
NON HDL CHOLESTEROL: 164 MG/DL (ref 0–149)
NRBC BLD-RTO: 0 /100 WBCS (ref 0–0)
PH UR STRIP.AUTO: 5 [PH]
PLATELET # BLD AUTO: 271 X10*3/UL (ref 150–450)
POTASSIUM SERPL-SCNC: 4.4 MMOL/L (ref 3.5–5.3)
PROT SERPL-MCNC: 7.3 G/DL (ref 6.4–8.2)
PROT UR STRIP.AUTO-MCNC: NEGATIVE MG/DL
RBC # BLD AUTO: 4.54 X10*6/UL (ref 4–5.2)
RBC # UR STRIP.AUTO: ABNORMAL /UL
RBC #/AREA URNS AUTO: ABNORMAL /HPF
SODIUM SERPL-SCNC: 142 MMOL/L (ref 136–145)
SP GR UR STRIP.AUTO: 1.02
SQUAMOUS #/AREA URNS AUTO: ABNORMAL /HPF
TRIGL SERPL-MCNC: 174 MG/DL (ref 0–149)
TSH SERPL-ACNC: 1.24 MIU/L (ref 0.44–3.98)
UROBILINOGEN UR STRIP.AUTO-MCNC: NORMAL MG/DL
VLDL: 35 MG/DL (ref 0–40)
WBC # BLD AUTO: 5.8 X10*3/UL (ref 4.4–11.3)
WBC #/AREA URNS AUTO: ABNORMAL /HPF

## 2024-07-30 PROCEDURE — 36415 COLL VENOUS BLD VENIPUNCTURE: CPT

## 2024-07-31 LAB — BACTERIA UR CULT: NO GROWTH

## 2024-09-09 ENCOUNTER — APPOINTMENT (OUTPATIENT)
Dept: GASTROENTEROLOGY | Facility: CLINIC | Age: 74
End: 2024-09-09
Payer: MEDICARE

## 2024-10-16 ENCOUNTER — OFFICE VISIT (OUTPATIENT)
Dept: URGENT CARE | Age: 74
End: 2024-10-16
Payer: MEDICARE

## 2024-10-16 VITALS
SYSTOLIC BLOOD PRESSURE: 140 MMHG | DIASTOLIC BLOOD PRESSURE: 73 MMHG | OXYGEN SATURATION: 96 % | RESPIRATION RATE: 16 BRPM | HEART RATE: 97 BPM

## 2024-10-16 DIAGNOSIS — J32.9 SINUSITIS, UNSPECIFIED CHRONICITY, UNSPECIFIED LOCATION: ICD-10-CM

## 2024-10-16 DIAGNOSIS — J40 BRONCHITIS: Primary | ICD-10-CM

## 2024-10-16 PROCEDURE — 3077F SYST BP >= 140 MM HG: CPT | Performed by: PHYSICIAN ASSISTANT

## 2024-10-16 PROCEDURE — 1123F ACP DISCUSS/DSCN MKR DOCD: CPT | Performed by: PHYSICIAN ASSISTANT

## 2024-10-16 PROCEDURE — 1036F TOBACCO NON-USER: CPT | Performed by: PHYSICIAN ASSISTANT

## 2024-10-16 PROCEDURE — 3078F DIAST BP <80 MM HG: CPT | Performed by: PHYSICIAN ASSISTANT

## 2024-10-16 PROCEDURE — 1159F MED LIST DOCD IN RCRD: CPT | Performed by: PHYSICIAN ASSISTANT

## 2024-10-16 PROCEDURE — 99213 OFFICE O/P EST LOW 20 MIN: CPT | Performed by: PHYSICIAN ASSISTANT

## 2024-10-16 PROCEDURE — 1160F RVW MEDS BY RX/DR IN RCRD: CPT | Performed by: PHYSICIAN ASSISTANT

## 2024-10-16 ASSESSMENT — ENCOUNTER SYMPTOMS
SINUS PRESSURE: 1
DIAPHORESIS: 0
ACTIVITY CHANGE: 0
TROUBLE SWALLOWING: 0
FEVER: 0
SORE THROAT: 0
CHILLS: 0
FATIGUE: 1
APPETITE CHANGE: 0
UNEXPECTED WEIGHT CHANGE: 0
VOICE CHANGE: 0
SINUS PAIN: 1

## 2024-10-16 NOTE — PROGRESS NOTES
Subjective   Patient ID: Taryn Brar is a 74 y.o. female. They present today with a chief complaint of dry cough (Pt c/o dry cough x1 month).    History of Present Illness  Patient is feeling some sinus drainage and pressure the last few days.  Prior to that she had a dry cough for about a month.      History provided by:  Patient   used: No        Past Medical History  Allergies as of 10/16/2024    (No Known Allergies)       (Not in a hospital admission)       Past Medical History:   Diagnosis Date    Abnormal electrocardiogram (ECG) (EKG) 10/08/2018    Abnormal EKG    Acute upper respiratory infection, unspecified 08/27/2019    Viral URI    Age-related osteoporosis without current pathological fracture 01/11/2018    Osteoporosis    Encounter for general adult medical examination without abnormal findings 11/18/2020    Medicare annual wellness visit, subsequent    Encounter for other preprocedural examination 10/08/2018    Pre-op evaluation    Hypertrophy of breast 10/08/2018    Macromastia    Personal history of other diseases of the respiratory system 02/11/2020    History of acute sinusitis       Past Surgical History:   Procedure Laterality Date    APPENDECTOMY  07/01/2016    Appendectomy    BREAST SURGERY Bilateral     Breast Reduction    INCISIONAL BREAST BIOPSY  07/01/2016    Incisional Breast Biopsy    TUBAL LIGATION  07/01/2016    Tubal Ligation        reports that she has never smoked. She has never used smokeless tobacco. She reports that she does not drink alcohol and does not use drugs.    Review of Systems  Review of Systems   Constitutional:  Positive for fatigue. Negative for activity change, appetite change, chills, diaphoresis, fever and unexpected weight change.   HENT:  Positive for ear pain, postnasal drip, sinus pressure and sinus pain. Negative for sore throat, trouble swallowing and voice change.         Slight ear pain.  She has some pressure in the sinuses and sinus  drainage with postnasal drip                                  Objective    Vitals:    10/16/24 1603   BP: 140/73   Pulse: 97   Resp: 16   SpO2: 96%     No LMP recorded.    Physical Exam  Vitals and nursing note reviewed.   Constitutional:       Appearance: Normal appearance. She is normal weight.      Comments: Pleasant cooperative 74-year-old female in no acute distress.  Vital signs stable.   HENT:      Head: Normocephalic and atraumatic.      Right Ear: Tympanic membrane, ear canal and external ear normal.      Left Ear: Tympanic membrane, ear canal and external ear normal.      Nose: Nose normal.      Comments: Tenderness to the maxillary sinuses, frontal sinuses are nontender.  Postnasal drip is noted in the posterior oropharynx     Mouth/Throat:      Mouth: Mucous membranes are moist.      Pharynx: Oropharynx is clear. No oropharyngeal exudate or posterior oropharyngeal erythema.   Eyes:      Extraocular Movements: Extraocular movements intact.      Conjunctiva/sclera: Conjunctivae normal.      Pupils: Pupils are equal, round, and reactive to light.   Cardiovascular:      Rate and Rhythm: Normal rate and regular rhythm.   Pulmonary:      Effort: Pulmonary effort is normal. No respiratory distress.      Breath sounds: Normal breath sounds.      Comments: Frequent bronchospastic cough  Musculoskeletal:         General: Normal range of motion.      Cervical back: Normal range of motion and neck supple. No rigidity.   Lymphadenopathy:      Cervical: No cervical adenopathy.   Skin:     General: Skin is warm and dry.   Neurological:      General: No focal deficit present.      Mental Status: She is alert and oriented to person, place, and time.   Psychiatric:         Mood and Affect: Mood normal.         Behavior: Behavior normal.         Procedures    Point of Care Test & Imaging Results from this visit  No results found for this visit on 10/16/24.   No results found.    Diagnostic study results (if any) were  reviewed by Kristel Kennedy PA-C.    Assessment/Plan   Allergies, medications, history, and pertinent labs/EKGs/Imaging reviewed by Kristel Kennedy PA-C.     Medical Decision Making  History and physical exam are consistent with sinusitis bronchitis.  Patient and I discussed the best treatment options.  She feels that Zithromax has worked well for her in the past.  As her bronchitis predates her sinus symptoms by quite a bit and it has worked well for her in the past we will initially prescribe Zithromax.  Also prescribe Medrol Dosepak and an inhaler.  Patient has used Tessalon Perles in the past and feels they do not work well for her.  We discussed reasons for her to go to the emergency department such as increasing symptoms fever difficulty breathing chest pain etc.    Orders and Diagnoses  There are no diagnoses linked to this encounter.    Medical Admin Record      Patient disposition: Home    Electronically signed by Kristel Kennedy PA-C  4:45 PM

## 2024-10-17 RX ORDER — AZITHROMYCIN 250 MG/1
TABLET, FILM COATED ORAL
Qty: 6 TABLET | Refills: 0 | Status: SHIPPED | OUTPATIENT
Start: 2024-10-17 | End: 2024-10-22

## 2024-10-17 RX ORDER — METHYLPREDNISOLONE 4 MG/1
TABLET ORAL
Qty: 21 TABLET | Refills: 0 | Status: SHIPPED | OUTPATIENT
Start: 2024-10-17

## 2024-10-24 ENCOUNTER — OFFICE VISIT (OUTPATIENT)
Dept: PRIMARY CARE | Facility: CLINIC | Age: 74
End: 2024-10-24
Payer: MEDICARE

## 2024-10-24 VITALS
WEIGHT: 145 LBS | SYSTOLIC BLOOD PRESSURE: 134 MMHG | HEART RATE: 74 BPM | HEIGHT: 65 IN | RESPIRATION RATE: 12 BRPM | OXYGEN SATURATION: 98 % | BODY MASS INDEX: 24.16 KG/M2 | TEMPERATURE: 97.6 F | DIASTOLIC BLOOD PRESSURE: 74 MMHG

## 2024-10-24 DIAGNOSIS — J01.00 ACUTE NON-RECURRENT MAXILLARY SINUSITIS: Primary | ICD-10-CM

## 2024-10-24 PROCEDURE — 1036F TOBACCO NON-USER: CPT | Performed by: FAMILY MEDICINE

## 2024-10-24 PROCEDURE — 3078F DIAST BP <80 MM HG: CPT | Performed by: FAMILY MEDICINE

## 2024-10-24 PROCEDURE — 1123F ACP DISCUSS/DSCN MKR DOCD: CPT | Performed by: FAMILY MEDICINE

## 2024-10-24 PROCEDURE — 3008F BODY MASS INDEX DOCD: CPT | Performed by: FAMILY MEDICINE

## 2024-10-24 PROCEDURE — 1159F MED LIST DOCD IN RCRD: CPT | Performed by: FAMILY MEDICINE

## 2024-10-24 PROCEDURE — 3075F SYST BP GE 130 - 139MM HG: CPT | Performed by: FAMILY MEDICINE

## 2024-10-24 PROCEDURE — 99213 OFFICE O/P EST LOW 20 MIN: CPT | Performed by: FAMILY MEDICINE

## 2024-10-24 RX ORDER — AMOXICILLIN AND CLAVULANATE POTASSIUM 875; 125 MG/1; MG/1
875 TABLET, FILM COATED ORAL 2 TIMES DAILY
Qty: 20 TABLET | Refills: 0 | Status: SHIPPED | OUTPATIENT
Start: 2024-10-24 | End: 2024-11-03

## 2024-10-24 ASSESSMENT — ENCOUNTER SYMPTOMS
HEADACHES: 0
DYSURIA: 0
OCCASIONAL FEELINGS OF UNSTEADINESS: 0
FATIGUE: 0
NAUSEA: 0
CHEST TIGHTNESS: 0
SINUS PRESSURE: 1
SHORTNESS OF BREATH: 0
DEPRESSION: 0
SLEEP DISTURBANCE: 0
LIGHT-HEADEDNESS: 0
NERVOUS/ANXIOUS: 0
ABDOMINAL DISTENTION: 0
DYSPHORIC MOOD: 0
MYALGIAS: 1
APPETITE CHANGE: 0
DIARRHEA: 0
DIFFICULTY URINATING: 0
FEVER: 0
ABDOMINAL PAIN: 0
COUGH: 1
ADENOPATHY: 0
SORE THROAT: 1
CHILLS: 0
ARTHRALGIAS: 0
BRUISES/BLEEDS EASILY: 0
LOSS OF SENSATION IN FEET: 0
WHEEZING: 0

## 2024-10-24 NOTE — PROGRESS NOTES
"Subjective   Patient ID: Taryn Brar is a 74 y.o. female who presents for Follow-up.    HPI     Review of Systems    Objective   /74   Pulse 74   Temp 36.4 °C (97.6 °F) (Temporal)   Resp 12   Ht 1.651 m (5' 5\")   Wt 65.8 kg (145 lb)   SpO2 98%   BMI 24.13 kg/m²     Physical Exam    Assessment/Plan          "

## 2024-10-24 NOTE — PROGRESS NOTES
"Subjective   Patient ID: Taryn Brar is a 74 y.o. female who presents for Follow-up.  Pt has sinus pressure, PND, ST, dry cough . Also has b/l ear pressure. Never had fever but does have myalgias. Onset was 2.5weeks ago.  Pt reports symptoms are worsening.   Pt has tried Corcidin for treatment without improvement.         Review of Systems   Constitutional:  Negative for appetite change, chills, fatigue and fever.   HENT:  Positive for congestion, ear pain, postnasal drip, sinus pressure and sore throat. Negative for hearing loss.    Eyes:  Negative for visual disturbance.   Respiratory:  Positive for cough. Negative for chest tightness, shortness of breath and wheezing.    Cardiovascular:  Negative for chest pain.   Gastrointestinal:  Negative for abdominal distention, abdominal pain, diarrhea and nausea.   Genitourinary:  Negative for difficulty urinating, dysuria and pelvic pain.   Musculoskeletal:  Positive for myalgias. Negative for arthralgias.   Skin:  Negative for rash.   Allergic/Immunologic: Negative for immunocompromised state.   Neurological:  Negative for light-headedness and headaches.   Hematological:  Negative for adenopathy. Does not bruise/bleed easily.   Psychiatric/Behavioral:  Negative for dysphoric mood and sleep disturbance. The patient is not nervous/anxious.        Objective   /74   Pulse 74   Temp 36.4 °C (97.6 °F) (Temporal)   Resp 12   Ht 1.651 m (5' 5\")   Wt 65.8 kg (145 lb)   SpO2 98%   BMI 24.13 kg/m²    Physical Exam  Constitutional:       General: She is not in acute distress.     Appearance: Normal appearance. She is not ill-appearing.   HENT:      Right Ear: No middle ear effusion. Tympanic membrane is not retracted.      Left Ear:  No middle ear effusion. Tympanic membrane is not retracted.      Nose: Congestion present.      Right Turbinates: Swollen.      Left Turbinates: Swollen.      Right Sinus: Maxillary sinus tenderness present. No frontal sinus tenderness.    "   Left Sinus: Maxillary sinus tenderness present. No frontal sinus tenderness.      Mouth/Throat:      Mouth: Mucous membranes are moist.      Pharynx: No oropharyngeal exudate or posterior oropharyngeal erythema.   Eyes:      Extraocular Movements: Extraocular movements intact.      Conjunctiva/sclera: Conjunctivae normal.      Pupils: Pupils are equal, round, and reactive to light.   Neck:      Thyroid: No thyroid mass or thyromegaly.      Vascular: No carotid bruit.   Cardiovascular:      Rate and Rhythm: Normal rate and regular rhythm.      Heart sounds: Normal heart sounds. No murmur heard.  Pulmonary:      Breath sounds: Normal breath sounds. No wheezing, rhonchi or rales.   Abdominal:      General: Bowel sounds are normal. There is no distension.      Palpations: Abdomen is soft. There is no mass.      Tenderness: There is no abdominal tenderness.   Musculoskeletal:         General: No swelling or deformity.      Cervical back: Neck supple. No tenderness.   Lymphadenopathy:      Cervical: No cervical adenopathy.   Skin:     General: Skin is warm and dry.      Findings: No lesion or rash.   Neurological:      Mental Status: She is alert and oriented to person, place, and time.      Sensory: No sensory deficit.      Motor: No weakness.      Coordination: Coordination normal.      Deep Tendon Reflexes: Reflexes normal.   Psychiatric:         Mood and Affect: Mood normal.         Behavior: Behavior normal.         Judgment: Judgment normal.           Assessment/Plan   Diagnoses and all orders for this visit:  Acute non-recurrent maxillary sinusitis- will treat with Augmentin 10days. Recommend rest, increased fluids, nasal saline at least 3x daily (Ie. Sinus Rinse), salt water gargles, and Tylenol as needed.     Follow up as planned

## 2024-11-03 DIAGNOSIS — I10 BENIGN HYPERTENSION: Primary | ICD-10-CM

## 2024-11-06 ENCOUNTER — APPOINTMENT (OUTPATIENT)
Dept: PRIMARY CARE | Facility: CLINIC | Age: 74
End: 2024-11-06
Payer: MEDICARE

## 2024-11-06 VITALS
HEART RATE: 76 BPM | BODY MASS INDEX: 23.99 KG/M2 | OXYGEN SATURATION: 98 % | WEIGHT: 144 LBS | SYSTOLIC BLOOD PRESSURE: 128 MMHG | RESPIRATION RATE: 12 BRPM | HEIGHT: 65 IN | DIASTOLIC BLOOD PRESSURE: 82 MMHG

## 2024-11-06 DIAGNOSIS — Z13.820 ENCOUNTER FOR OSTEOPOROSIS SCREENING IN ASYMPTOMATIC POSTMENOPAUSAL PATIENT: ICD-10-CM

## 2024-11-06 DIAGNOSIS — I10 BENIGN HYPERTENSION: ICD-10-CM

## 2024-11-06 DIAGNOSIS — Z00.00 HEALTHCARE MAINTENANCE: Primary | ICD-10-CM

## 2024-11-06 DIAGNOSIS — R05.8 DRY COUGH: ICD-10-CM

## 2024-11-06 DIAGNOSIS — R92.8 ABNORMAL SCREENING MAMMOGRAM: ICD-10-CM

## 2024-11-06 DIAGNOSIS — E78.2 MIXED HYPERLIPIDEMIA: ICD-10-CM

## 2024-11-06 DIAGNOSIS — Z78.0 ENCOUNTER FOR OSTEOPOROSIS SCREENING IN ASYMPTOMATIC POSTMENOPAUSAL PATIENT: ICD-10-CM

## 2024-11-06 PROCEDURE — 3074F SYST BP LT 130 MM HG: CPT | Performed by: FAMILY MEDICINE

## 2024-11-06 PROCEDURE — 1170F FXNL STATUS ASSESSED: CPT | Performed by: FAMILY MEDICINE

## 2024-11-06 PROCEDURE — G0439 PPPS, SUBSEQ VISIT: HCPCS | Performed by: FAMILY MEDICINE

## 2024-11-06 PROCEDURE — 3008F BODY MASS INDEX DOCD: CPT | Performed by: FAMILY MEDICINE

## 2024-11-06 PROCEDURE — 1123F ACP DISCUSS/DSCN MKR DOCD: CPT | Performed by: FAMILY MEDICINE

## 2024-11-06 PROCEDURE — G0008 ADMIN INFLUENZA VIRUS VAC: HCPCS | Performed by: FAMILY MEDICINE

## 2024-11-06 PROCEDURE — 1036F TOBACCO NON-USER: CPT | Performed by: FAMILY MEDICINE

## 2024-11-06 PROCEDURE — 99213 OFFICE O/P EST LOW 20 MIN: CPT | Performed by: FAMILY MEDICINE

## 2024-11-06 PROCEDURE — 1158F ADVNC CARE PLAN TLK DOCD: CPT | Performed by: FAMILY MEDICINE

## 2024-11-06 PROCEDURE — 90662 IIV NO PRSV INCREASED AG IM: CPT | Performed by: FAMILY MEDICINE

## 2024-11-06 PROCEDURE — 1160F RVW MEDS BY RX/DR IN RCRD: CPT | Performed by: FAMILY MEDICINE

## 2024-11-06 PROCEDURE — 1159F MED LIST DOCD IN RCRD: CPT | Performed by: FAMILY MEDICINE

## 2024-11-06 PROCEDURE — 3079F DIAST BP 80-89 MM HG: CPT | Performed by: FAMILY MEDICINE

## 2024-11-06 RX ORDER — AMLODIPINE BESYLATE 5 MG/1
5 TABLET ORAL DAILY
Qty: 100 TABLET | Refills: 1 | Status: SHIPPED | OUTPATIENT
Start: 2024-11-06 | End: 2025-05-05

## 2024-11-06 ASSESSMENT — ENCOUNTER SYMPTOMS
SLEEP DISTURBANCE: 0
MYALGIAS: 0
ARTHRALGIAS: 0
NERVOUS/ANXIOUS: 0
NAUSEA: 0
CHEST TIGHTNESS: 0
DEPRESSION: 0
ABDOMINAL PAIN: 0
DIFFICULTY URINATING: 0
LOSS OF SENSATION IN FEET: 0
WHEEZING: 0
CHILLS: 0
DYSPHORIC MOOD: 0
FATIGUE: 0
BRUISES/BLEEDS EASILY: 0
DIARRHEA: 0
SHORTNESS OF BREATH: 0
DYSURIA: 0
OCCASIONAL FEELINGS OF UNSTEADINESS: 0
FEVER: 0
LIGHT-HEADEDNESS: 0
ADENOPATHY: 0
HEADACHES: 0
ABDOMINAL DISTENTION: 0
APPETITE CHANGE: 0
SINUS PRESSURE: 0

## 2024-11-06 ASSESSMENT — PATIENT HEALTH QUESTIONNAIRE - PHQ9
1. LITTLE INTEREST OR PLEASURE IN DOING THINGS: NOT AT ALL
2. FEELING DOWN, DEPRESSED OR HOPELESS: NOT AT ALL
SUM OF ALL RESPONSES TO PHQ9 QUESTIONS 1 AND 2: 0

## 2024-11-06 ASSESSMENT — ACTIVITIES OF DAILY LIVING (ADL)
GROCERY_SHOPPING: INDEPENDENT
DRESSING: INDEPENDENT
MANAGING_FINANCES: INDEPENDENT
DOING_HOUSEWORK: INDEPENDENT
TAKING_MEDICATION: INDEPENDENT
BATHING: INDEPENDENT

## 2024-11-06 NOTE — PROGRESS NOTES
"Subjective   Patient ID: Taryn Brar is a 74 y.o. female who presents for Medicare Annual Wellness Visit Subsequent.  PMHX, PSHx, Fam hx, and Social hx reviewed.   New concerns  - she has ongoing ~2.5 -3month cough. It is nonproductive and she denies SOB, or fever. She is on Enalapril.  Vaccines Flu shot given today, Shingrix vaccines recommended at pharmacy  Dentist seen at least yearly yes  Vision concerns none  Hearing concerns none  Diet is  overall healthy.   Smoker - no  Lung CT screening - NA  Alcohol use - none  Exercising 7 days per week.   Colonoscopy 2022, current  Mammogram 2024  Last PAP NA  DEXA overdue  ACP - advance directives, code status, and DPOA documentation discussed     Pt has chronic HTN.  Pt is taking Enalapril. Tolerating well.  Exercising 7 days per week   Low sodium diet is usually being followed.   Is not monitoring home blood pressures.   Denies HA, vision changes or CP.         Review of Systems   Constitutional:  Negative for appetite change, chills, fatigue and fever.   HENT:  Negative for congestion, ear pain and sinus pressure.    Eyes:  Negative for visual disturbance.   Respiratory:  Negative for chest tightness, shortness of breath and wheezing.    Cardiovascular:  Negative for chest pain.   Gastrointestinal:  Negative for abdominal distention, abdominal pain, diarrhea and nausea.   Genitourinary:  Negative for difficulty urinating, dysuria and pelvic pain.   Musculoskeletal:  Negative for arthralgias and myalgias.   Skin:  Negative for rash.   Allergic/Immunologic: Negative for immunocompromised state.   Neurological:  Negative for light-headedness and headaches.   Hematological:  Negative for adenopathy. Does not bruise/bleed easily.   Psychiatric/Behavioral:  Negative for dysphoric mood and sleep disturbance. The patient is not nervous/anxious.        Objective   /82   Pulse 76   Resp 12   Ht 1.651 m (5' 5\")   Wt 65.3 kg (144 lb)   SpO2 98%   BMI 23.96 kg/m²  "   Physical Exam  Constitutional:       General: She is not in acute distress.     Appearance: Normal appearance. She is not ill-appearing.   HENT:      Head: Normocephalic and atraumatic.      Right Ear: Tympanic membrane, ear canal and external ear normal.      Left Ear: Tympanic membrane, ear canal and external ear normal.      Nose: Nose normal.      Mouth/Throat:      Mouth: Mucous membranes are moist.      Pharynx: No oropharyngeal exudate or posterior oropharyngeal erythema.   Eyes:      Extraocular Movements: Extraocular movements intact.      Conjunctiva/sclera: Conjunctivae normal.      Pupils: Pupils are equal, round, and reactive to light.   Neck:      Thyroid: No thyroid mass or thyromegaly.      Vascular: No carotid bruit.   Cardiovascular:      Rate and Rhythm: Normal rate and regular rhythm.      Heart sounds: Normal heart sounds. No murmur heard.  Pulmonary:      Breath sounds: Normal breath sounds. No wheezing, rhonchi or rales.   Abdominal:      General: Bowel sounds are normal. There is no distension.      Palpations: Abdomen is soft. There is no mass.      Tenderness: There is no abdominal tenderness.   Musculoskeletal:         General: No swelling or deformity.      Cervical back: Neck supple. No tenderness.   Lymphadenopathy:      Cervical: No cervical adenopathy.   Skin:     General: Skin is warm and dry.      Findings: No lesion or rash.   Neurological:      Mental Status: She is alert and oriented to person, place, and time.      Sensory: No sensory deficit.      Motor: No weakness.      Coordination: Coordination normal.      Deep Tendon Reflexes: Reflexes normal.   Psychiatric:         Mood and Affect: Mood normal.         Behavior: Behavior normal.         Judgment: Judgment normal.     Medicare Wellness Billing Compliance Satisfied    *This is a visual tool to show completion of required items on the day of the visit. Green checks will only appear on the date of visit.    Review all  medications by prescribing practitioner or clinical pharmacist (such as prescriptions, OTCs, herbal therapies and supplements) documented in the medical record    Past Medical, Surgical, and Family History reviewed and updated in chart    Tobacco Use Reviewed    Alcohol Use Reviewed    Illicit Drug Use Reviewed    PHQ2/9    Falls in Last Year Reviewed    Home Safety Risk Factors Reviewed    Cognitive Impairment Reviewed    Patient Self Assessment and Health Status    Current Diet Reviewed    Exercise Frequency    ADL - Hearing Impairment    ADL - Bathing    ADL - Dressing    ADL - Walks in Home    IADL - Managing Finances    IADL - Grocery Shopping    IADL - Taking Medications    IADL - Doing Housework          Assessment/Plan   Diagnoses and all orders for this visit:  Healthcare maintenance - Flu shot given, other vaccines current. Recent labs reviewed and discussed. Colonoscopy current. Mamm and DEXA ordered to schedule in February.  Dry cough /Benign hypertension - stop Enalapril and start Amlodipine and monitor.  Mixed hyperlipidemia - LDL and TG are high, keep working on diet and exercise and we'll monitor.     Follow up in 6 months, 15mins

## 2024-11-06 NOTE — PROGRESS NOTES
"Subjective   Reason for Visit: Taryn Brar is an 74 y.o. female here for a Medicare Wellness visit.     Past Medical, Surgical, and Family History reviewed and updated in chart.    Reviewed all medications by prescribing practitioner or clinical pharmacist (such as prescriptions, OTCs, herbal therapies and supplements) and documented in the medical record.    HPI    Patient Care Team:  Danilo Moore MD as PCP - General (Family Medicine)     Review of Systems    Objective   Vitals:  /82   Pulse 76   Resp 12   Ht 1.651 m (5' 5\")   Wt 65.3 kg (144 lb)   SpO2 98%   BMI 23.96 kg/m²       Physical Exam    Assessment & Plan              "

## 2024-11-08 ENCOUNTER — TELEPHONE (OUTPATIENT)
Dept: PRIMARY CARE | Facility: CLINIC | Age: 74
End: 2024-11-08
Payer: MEDICARE

## 2024-11-08 RX ORDER — NIFEDIPINE 30 MG/1
30 TABLET, FILM COATED, EXTENDED RELEASE ORAL DAILY
Qty: 90 TABLET | Refills: 1 | Status: SHIPPED | OUTPATIENT
Start: 2024-11-08 | End: 2025-05-07

## 2024-11-08 NOTE — TELEPHONE ENCOUNTER
Pt states she was on norvasc 2.5mg prior and it made her very dizzy and she stopped taking it. Do you want her to try again or change the medication?

## 2024-11-19 ENCOUNTER — TELEPHONE (OUTPATIENT)
Dept: PRIMARY CARE | Facility: CLINIC | Age: 74
End: 2024-11-19
Payer: MEDICARE

## 2024-11-19 DIAGNOSIS — I10 BENIGN HYPERTENSION: Primary | ICD-10-CM

## 2024-11-19 RX ORDER — OLMESARTAN MEDOXOMIL 5 MG/1
5 TABLET ORAL DAILY
Qty: 30 TABLET | Refills: 0 | Status: SHIPPED | OUTPATIENT
Start: 2024-11-19 | End: 2025-11-19

## 2024-11-19 NOTE — TELEPHONE ENCOUNTER
Pt states new bp med is also making her dizzy and headaches. Bp 132/63. Quit taking it. Is there another option?

## 2024-12-05 ENCOUNTER — APPOINTMENT (OUTPATIENT)
Dept: PRIMARY CARE | Facility: CLINIC | Age: 74
End: 2024-12-05
Payer: MEDICARE

## 2024-12-06 ENCOUNTER — OFFICE VISIT (OUTPATIENT)
Dept: PRIMARY CARE | Facility: CLINIC | Age: 74
End: 2024-12-06
Payer: MEDICARE

## 2024-12-06 VITALS
RESPIRATION RATE: 12 BRPM | SYSTOLIC BLOOD PRESSURE: 126 MMHG | BODY MASS INDEX: 23.66 KG/M2 | OXYGEN SATURATION: 97 % | DIASTOLIC BLOOD PRESSURE: 74 MMHG | HEIGHT: 65 IN | HEART RATE: 72 BPM | WEIGHT: 142 LBS

## 2024-12-06 DIAGNOSIS — I10 BENIGN HYPERTENSION: Primary | ICD-10-CM

## 2024-12-06 PROCEDURE — 1123F ACP DISCUSS/DSCN MKR DOCD: CPT | Performed by: FAMILY MEDICINE

## 2024-12-06 PROCEDURE — 3074F SYST BP LT 130 MM HG: CPT | Performed by: FAMILY MEDICINE

## 2024-12-06 PROCEDURE — 3078F DIAST BP <80 MM HG: CPT | Performed by: FAMILY MEDICINE

## 2024-12-06 PROCEDURE — 3008F BODY MASS INDEX DOCD: CPT | Performed by: FAMILY MEDICINE

## 2024-12-06 PROCEDURE — 1159F MED LIST DOCD IN RCRD: CPT | Performed by: FAMILY MEDICINE

## 2024-12-06 RX ORDER — EVENING PRIMROSE OIL 500 MG
CAPSULE ORAL DAILY
COMMUNITY

## 2024-12-06 NOTE — PROGRESS NOTES
"Subjective   Patient ID: Taryn Brar is a 74 y.o. female who presents for Blood Pressure Check.    HPI     Review of Systems    Objective   /74   Pulse 72   Resp 12   Ht 1.651 m (5' 5\")   Wt 64.4 kg (142 lb)   SpO2 97%   BMI 23.63 kg/m²     Physical Exam    Assessment/Plan          "

## 2024-12-16 DIAGNOSIS — I10 BENIGN HYPERTENSION: ICD-10-CM

## 2024-12-16 RX ORDER — OLMESARTAN MEDOXOMIL 5 MG/1
5 TABLET ORAL DAILY
Qty: 90 TABLET | Refills: 1 | Status: SHIPPED | OUTPATIENT
Start: 2024-12-16 | End: 2025-12-16

## 2024-12-18 ENCOUNTER — OFFICE VISIT (OUTPATIENT)
Dept: URGENT CARE | Age: 74
End: 2024-12-18
Payer: MEDICARE

## 2024-12-18 VITALS
DIASTOLIC BLOOD PRESSURE: 73 MMHG | HEART RATE: 77 BPM | WEIGHT: 147.2 LBS | BODY MASS INDEX: 24.5 KG/M2 | SYSTOLIC BLOOD PRESSURE: 166 MMHG | OXYGEN SATURATION: 97 %

## 2024-12-18 DIAGNOSIS — J01.00 ACUTE NON-RECURRENT MAXILLARY SINUSITIS: Primary | ICD-10-CM

## 2024-12-18 LAB
POC BINAX EXPIRATION: 0
POC BINAX NOW COVID SERIAL NUMBER: 0
POC SARS-COV-2 AG BINAX: NORMAL

## 2024-12-18 PROCEDURE — 3077F SYST BP >= 140 MM HG: CPT | Performed by: NURSE PRACTITIONER

## 2024-12-18 PROCEDURE — 1160F RVW MEDS BY RX/DR IN RCRD: CPT | Performed by: NURSE PRACTITIONER

## 2024-12-18 PROCEDURE — 99213 OFFICE O/P EST LOW 20 MIN: CPT | Performed by: NURSE PRACTITIONER

## 2024-12-18 PROCEDURE — 87811 SARS-COV-2 COVID19 W/OPTIC: CPT | Performed by: NURSE PRACTITIONER

## 2024-12-18 PROCEDURE — 3078F DIAST BP <80 MM HG: CPT | Performed by: NURSE PRACTITIONER

## 2024-12-18 PROCEDURE — 1036F TOBACCO NON-USER: CPT | Performed by: NURSE PRACTITIONER

## 2024-12-18 PROCEDURE — 1123F ACP DISCUSS/DSCN MKR DOCD: CPT | Performed by: NURSE PRACTITIONER

## 2024-12-18 PROCEDURE — 1159F MED LIST DOCD IN RCRD: CPT | Performed by: NURSE PRACTITIONER

## 2024-12-18 RX ORDER — AMOXICILLIN 875 MG/1
875 TABLET, FILM COATED ORAL 2 TIMES DAILY
Qty: 20 TABLET | Refills: 0 | Status: SHIPPED | OUTPATIENT
Start: 2024-12-18 | End: 2024-12-28

## 2024-12-18 NOTE — LETTER
December 18, 2024     Patient: Taryn Brar   YOB: 1950   Date of Visit: 12/18/2024       To Whom It May Concern:    Taryn Brar was seen in my clinic on 12/18/2024 Please excuse Taryn for her absence from work on this day to make the appointment.    If you have any questions or concerns, please don't hesitate to call.         Sincerely,         Jennifer Ríos, MI-CNP

## 2024-12-18 NOTE — PROGRESS NOTES
Subjective   Patient ID: Taryn Brar is a 74 y.o. female. They present today with a chief complaint of Sinus Problem (Head cold, x3days ).    History of Present Illness  73 yo female coming in for sinus pressure, sore throat, ear pain, and mild cough. She states she has had a headache and mild body aching as well. She states all of these symptoms started suddenly on Monday.     Past Medical History  Allergies as of 12/18/2024 - Reviewed 12/18/2024   Allergen Reaction Noted    Norvasc [amlodipine] Dizziness 11/08/2024       (Not in a hospital admission)       Past Medical History:   Diagnosis Date    Abnormal electrocardiogram (ECG) (EKG) 10/08/2018    Abnormal EKG    Acute upper respiratory infection, unspecified 08/27/2019    Viral URI    Age-related osteoporosis without current pathological fracture 01/11/2018    Osteoporosis    Encounter for general adult medical examination without abnormal findings 11/18/2020    Medicare annual wellness visit, subsequent    Encounter for other preprocedural examination 10/08/2018    Pre-op evaluation    Hypertrophy of breast 10/08/2018    Macromastia    Personal history of other diseases of the respiratory system 02/11/2020    History of acute sinusitis       Past Surgical History:   Procedure Laterality Date    APPENDECTOMY  07/01/2016    Appendectomy    BREAST SURGERY Bilateral     Breast Reduction    INCISIONAL BREAST BIOPSY  07/01/2016    Incisional Breast Biopsy    TUBAL LIGATION  07/01/2016    Tubal Ligation        reports that she has never smoked. She has never used smokeless tobacco. She reports that she does not drink alcohol and does not use drugs.    Review of Systems  Review of Systems:  General: No weight loss, fatigue, anorexia, insomnia, fever, positive chills.  ENT: Positive pharyngitis, no dry mouth, positive nasal congestion and sinus presure, ear pain  Cardiac: No chest pain, palpitations, syncope, near syncope.  Pulmonary:  No shortness of breath, positive  cough, no hemoptysis  Heme/lymph: No swollen glands, fever, bleeding  Musculoskeletal: No limb pain, joint pain, joint swelling.  Skin: No rashes  Neuro: No numbness, tingling, positive headaches                                 Objective    Vitals:    12/18/24 1122   BP: 166/73   Pulse: 77   SpO2: 97%   Weight: 66.8 kg (147 lb 3.2 oz)     No LMP recorded.    Physical Exam  Physical Exam:  General: Vital noted, no distress. Afebrile  EENT: Eyes unremarkable, Pupils PERRLA, EOMs intact. TMs unremarkable. Posterior oropharynx unremarkable. Uvula in the midline and non-edematous. No PTA. No retropharyngeal mass. No Natalio's angina.  Cardiac: Regular rate and rhythm, no murmur  Pulmonary: Lungs clear bilaterally with good aeration. No adventitious breath sounds.  Skin: No rashes  Neuro: No focal neurologic deficits    Procedures    Point of Care Test & Imaging Results from this visit  Results for orders placed or performed in visit on 12/18/24   POCT Covid-19 Rapid Antigen   Result Value Ref Range    Binax NOW Covid Serial Number 0     BINAX NOW Covid Expiration 0     POC JONATHON-COV-2 AG  Presumptive negative test for SARS-CoV-2 (no antigen detected)     Presumptive negative test for SARS-CoV-2 (no antigen detected)      No results found.    Diagnostic study results (if any) were reviewed by ALEXIS Del Real.    Assessment/Plan   Allergies, medications, history, and pertinent labs/EKGs/Imaging reviewed by ALEXIS Del Real.     Medical Decision Making  Testing: rapid covid  Treatment: Amoxicillin prescribed  Differential: 1) sinusitis , 2) covid, 3) uri  Plan: Patient will follow up with the PCP in the next 2-3 days. Return for any worsening symptoms or go to the ER for further evaluation. Patient understands return precautions and discharge insturctions.  Impression:   1) sinusitis      Orders and Diagnoses  Diagnoses and all orders for this visit:  Acute non-recurrent maxillary sinusitis  -     POCT  Covid-19 Rapid Antigen      Medical Admin Record      Patient disposition: Home    Electronically signed by ALEXIS Del Real  11:43 AM

## 2024-12-30 ENCOUNTER — LAB (OUTPATIENT)
Dept: LAB | Facility: LAB | Age: 74
End: 2024-12-30
Payer: MEDICARE

## 2024-12-30 DIAGNOSIS — I10 BENIGN HYPERTENSION: ICD-10-CM

## 2024-12-30 LAB
ANION GAP SERPL CALC-SCNC: 12 MMOL/L (ref 10–20)
BUN SERPL-MCNC: 15 MG/DL (ref 6–23)
CALCIUM SERPL-MCNC: 9.9 MG/DL (ref 8.6–10.6)
CHLORIDE SERPL-SCNC: 105 MMOL/L (ref 98–107)
CO2 SERPL-SCNC: 30 MMOL/L (ref 21–32)
CREAT SERPL-MCNC: 0.79 MG/DL (ref 0.5–1.05)
EGFRCR SERPLBLD CKD-EPI 2021: 79 ML/MIN/1.73M*2
GLUCOSE SERPL-MCNC: 87 MG/DL (ref 74–99)
POTASSIUM SERPL-SCNC: 4.4 MMOL/L (ref 3.5–5.3)
SODIUM SERPL-SCNC: 143 MMOL/L (ref 136–145)

## 2024-12-30 PROCEDURE — 80048 BASIC METABOLIC PNL TOTAL CA: CPT

## 2025-01-08 ENCOUNTER — APPOINTMENT (OUTPATIENT)
Dept: DERMATOLOGY | Facility: CLINIC | Age: 75
End: 2025-01-08
Payer: MEDICARE

## 2025-01-08 DIAGNOSIS — L21.9 SEBORRHEIC DERMATITIS: ICD-10-CM

## 2025-01-08 DIAGNOSIS — Z85.828 HISTORY OF NONMELANOMA SKIN CANCER: ICD-10-CM

## 2025-01-08 DIAGNOSIS — L82.1 SEBORRHEIC KERATOSIS: ICD-10-CM

## 2025-01-08 DIAGNOSIS — D22.5 MELANOCYTIC NEVUS OF TRUNK: ICD-10-CM

## 2025-01-08 DIAGNOSIS — L71.9 ROSACEA: ICD-10-CM

## 2025-01-08 DIAGNOSIS — D18.01 HEMANGIOMA OF SKIN: ICD-10-CM

## 2025-01-08 DIAGNOSIS — L57.8 DIFFUSE PHOTODAMAGE OF SKIN: ICD-10-CM

## 2025-01-08 DIAGNOSIS — L82.0 INFLAMED SEBORRHEIC KERATOSIS: ICD-10-CM

## 2025-01-08 DIAGNOSIS — D48.5 NEOPLASM OF UNCERTAIN BEHAVIOR OF SKIN: Primary | ICD-10-CM

## 2025-01-08 DIAGNOSIS — L57.0 ACTINIC KERATOSIS: ICD-10-CM

## 2025-01-08 PROCEDURE — 1123F ACP DISCUSS/DSCN MKR DOCD: CPT | Performed by: DERMATOLOGY

## 2025-01-08 PROCEDURE — 1159F MED LIST DOCD IN RCRD: CPT | Performed by: DERMATOLOGY

## 2025-01-08 PROCEDURE — 99214 OFFICE O/P EST MOD 30 MIN: CPT | Performed by: DERMATOLOGY

## 2025-01-08 PROCEDURE — 17000 DESTRUCT PREMALG LESION: CPT | Performed by: DERMATOLOGY

## 2025-01-08 PROCEDURE — 17003 DESTRUCT PREMALG LES 2-14: CPT | Performed by: DERMATOLOGY

## 2025-01-08 PROCEDURE — 17110 DESTRUCTION B9 LES UP TO 14: CPT | Performed by: DERMATOLOGY

## 2025-01-08 PROCEDURE — 11311 SHAVE SKIN LESION 0.6-1.0 CM: CPT | Performed by: DERMATOLOGY

## 2025-01-08 RX ORDER — METRONIDAZOLE 7.5 MG/G
CREAM TOPICAL
Qty: 45 G | Refills: 11 | Status: SHIPPED | OUTPATIENT
Start: 2025-01-08

## 2025-01-08 RX ORDER — KETOCONAZOLE 20 MG/ML
SHAMPOO, SUSPENSION TOPICAL
Qty: 120 ML | Refills: 11 | Status: SHIPPED | OUTPATIENT
Start: 2025-01-08

## 2025-01-08 ASSESSMENT — DERMATOLOGY PATIENT ASSESSMENT
DO YOU USE SUNSCREEN: DAILY
DO YOU USE A TANNING BED: NO
ARE YOU TRYING TO GET PREGNANT: NO
DO YOU HAVE IRREGULAR MENSTRUAL CYCLES: NO
DO YOU HAVE ANY NEW OR CHANGING LESIONS: NO
ARE YOU ON BIRTH CONTROL: NO

## 2025-01-08 ASSESSMENT — DERMATOLOGY QUALITY OF LIFE (QOL) ASSESSMENT: ARE THERE EXCLUSIONS OR EXCEPTIONS FOR THE QUALITY OF LIFE ASSESSMENT: NO

## 2025-01-09 NOTE — PROGRESS NOTES
"Subjective     Taryn Brar is a 74 y.o. female who presents for the following: Skin Exam.  She notes a raised, brown, rough bump underneath her left arm, which has been present for several months and has been itching recently.  It has not changed in any other way, including in size, shape, or color, and it does not hurt or bleed.  She also states she never picked up the MetroGel prescribed at her previous visit and has been breaking out with more pimples on her cheeks recently.  Lastly, she notes a dry, flaky scalp.  She denies any other new, changing, or concerning skin lesions since her last visit; no bleeding, itching, or burning lesions.      Review of Systems:  No other skin or systemic complaints other than what is documented elsewhere in the note.    The following portions of the chart were reviewed this encounter and updated as appropriate:       Skin Cancer History  Biopsy Date Type Location Status   1/10/24 BCC right lower back Treatment Complete  7/3/24   1/10/24 BCC lef medial mid back Treatment Complete  7/3/24     Specialty Problems          Dermatology Problems    Melanocytic nevi of trunk    Inflamed seborrheic keratosis    Neoplasm of uncertain behavior of skin    Melanocytic nevi of scalp and neck    Melanocytic nevi of unspecified lower limb, including hip       Past Dermatologic / Past Relevant Medical History:    - history of 2 BCCs on right lower back and left medial mid back both diagnosed on 1/10/24 s/p Mohs surgery by Dr. Mckinley on 4/5/24 and wide local excision with 5-mm margins by Dr. Mckinley on 6/14/24, respectively  - BCC on left lateral nasal dorsum diagnosed 7/19/23 s/p Mohs surgery by Dr. Mckinley 10/27/23  - AKs  - reported history of \"pre-cancers\" on [facility]  - no h/o atypical nevi or melanoma     Family History:    No family history of melanoma or skin cancer    Social History:    The patient and her , " who is a patient in our office as well, moved to [place] from [place], [place], to live near their daughter and son-in-law, who is a patient in our office as well, and she works part-time as a  at the [employer] in [place]    Allergies:  Norvasc [amlodipine]    Current Medications / CAM's:    Current Outpatient Medications:     cholecalciferol, vitamin D3, (VITAMIN D3 ORAL), Take by mouth., Disp: , Rfl:     ketoconazole (NIZOral) 2 % shampoo, Wash affected areas of scalp 2-3 times weekly as directed, Disp: 120 mL, Rfl: 11    metroNIDAZOLE (Metrocream) 0.75 % cream, Apply twice daily to affected areas of face, Disp: 45 g, Rfl: 11    olmesartan (Benicar) 5 mg tablet, Take 1 tablet (5 mg) by mouth once daily., Disp: 90 tablet, Rfl: 1    vitamin E 45 mg (100 unit) capsule, Take by mouth once daily., Disp: , Rfl:      Objective   Well appearing patient in no apparent distress; mood and affect are within normal limits.    A full examination was performed including scalp, face, eyes, ears, nose, lips, neck, chest, axillae, abdomen, back, bilateral upper extremities, and bilateral lower extremities. All findings within normal limits unless otherwise noted below.    Assessment/Plan   1. Neoplasm of uncertain behavior of skin  Left Inferior Temple  6 mm pink, shiny papule           Shave removal    Lesion length (cm):  0.6  Margin per side (cm):  0  Lesion diameter (cm):  0.6  Informed consent: discussed and consent obtained    Timeout: patient name, date of birth, surgical site, and procedure verified    Procedure prep:  Patient was prepped and draped  Anesthesia: the lesion was anesthetized in a standard fashion    Anesthetic:  1% lidocaine w/ epinephrine 1-100,000 local infiltration  Instrument used: flexible razor blade    Hemostasis achieved with: aluminum chloride    Outcome: patient tolerated procedure well    Post-procedure details: sterile dressing applied and wound care instructions given    Dressing type:  bandage and petrolatum      Staff Communication: Dermatology Local Anesthesia: 1 % Lidocaine / Epinephrine - Amount:0.5ml    Specimen 1 - Dermatopathology- DERM LAB  Differential Diagnosis: SK v BCC  Check Margins Yes/No?:    Comments:    Dermpath Lab: Routine Histopathology (formalin-fixed tissue)    2. Inflamed seborrheic keratosis  Left Axilla  On the patient's left inferior axilla, there is a 1 cm erythematous and light brown-colored, hyperkeratotic, stuck-on appearing papule with a surrounding rim of erythema    Inflamed Seborrheic Keratosis -left inferior axilla.  The benign nature of this lesion was discussed with the patient today and reassurance provided.  Given the history the patient provides of frequent irritation and associated symptoms as well as its inflamed appearance on exam today, I offered to treat this lesion with liquid nitrogen cryotherapy.  The patient expressed understanding, is in agreement with this plan, and wishes to proceed with cryotherapy today.    Destr of lesion - Left Axilla  Complexity: simple    Destruction method: cryotherapy    Informed consent: discussed and consent obtained    Lesion destroyed using liquid nitrogen: Yes    Cryotherapy cycles:  2  Outcome: patient tolerated procedure well with no complications    Post-procedure details: wound care instructions given      3. Actinic keratosis (2)  Head - Anterior (Face) (2)  On her right nasal sidewall and right nasal dorsum, there are 2 erythematous, gritty, scaly macules     Actinic Keratoses -right nasal sidewall and right nasal dorsum.  The pre-cancerous nature of these lesions and treatment options were discussed with the patient today.  At this time, I recommend treatment with liquid nitrogen cryotherapy.  The patient expressed understanding, is in agreement with this plan, and wishes to proceed with cryotherapy today.    Destr of lesion - Head - Anterior (Face) (2)  Complexity: simple    Destruction method: cryotherapy     Informed consent: discussed and consent obtained    Lesion destroyed using liquid nitrogen: Yes    Cryotherapy cycles:  1  Outcome: patient tolerated procedure well with no complications    Post-procedure details: wound care instructions given      4. Melanocytic nevus of trunk  Scattered on the patient's face, neck, trunk, and extremities, there are several small, round- to oval-shaped, brown-pigmented and pink-colored, symmetric, uniform-appearing macules and dome-shaped papules    Clinically benign- to slightly atypical-appearing nevi - the clinically benign- to slightly atypical-appearing nature of the patient's nevi was discussed with the patient today.  None of the patient's nevi meet threshold for biopsy today.  I emphasized the importance of performing monthly self-skin exams using the ABCDs of monitoring moles, which were reviewed with the patient today and an informational hand-out provided.  I also emphasized the importance of sun avoidance and sun protection with daily sunscreen use.  The patient expressed understanding and is in agreement with this plan.    5. Seborrheic keratosis  Scattered on the patient's face, neck, trunk, and extremities, there are multiple tan- to light brown-colored, hyperkeratotic, stuck-on appearing papules of varying size and shape    Seborrheic Keratoses - the benign nature of these lesions was discussed with the patient today and reassurance provided.  No treatment is medically indicated for the noninflamed SKs at this time.    6. Hemangioma of skin  Scattered on the patient's face, neck, trunk, and extremities, there are multiple small, round, cherry red- to purplish-colored, symmetric, uniform, vascular-appearing macules and papules    Cherry Angiomas - the benign nature of these vascular lesions was discussed with the patient today and reassurance provided.  No treatment is medically indicated for these lesions at this time.    7. Rosacea  Left Malar Cheek  On the  patient's face, most prominent over the bilateral medial cheeks and nose, there is moderate underlying erythema with several overlying telangiectasias and a few scattered erythematous, inflammatory papules     Rosacea -flare on face; papulo-pustular type.  The chronic and intermittently flaring nature of this condition and treatment options were discussed extensively with the patient today.  At this time, I recommend topical therapy with MetroCream 0.75%, which the patient was instructed to apply twice daily to the affected areas of the face.  I also discussed the various triggers of rosacea with the patient today, especially sun exposure, and emphasized the importance of trigger avoidance, particularly sun avoidance and sun protection with daily sunscreen use.  The risks, benefits, and side effects of this medication were discussed.  The patient expressed understanding and is in agreement with this plan.    metroNIDAZOLE (Metrocream) 0.75 % cream - Left Malar Cheek  Apply twice daily to affected areas of face    8. Seborrheic dermatitis  Mid Frontal Scalp  On the patient's scalp, there are pink, scaly patches with whitish-yellowish, greasy scale    Seborrheic Dermatitis - scalp.  The potentially chronic and intermittently flaring nature of this condition and treatment options were discussed extensively with the patient today.  At this time, I recommend topical anti-fungal therapy with Ketoconazole 2% shampoo, which the patient was instructed to use 2-3 days per week, alternating with over-the-counter anti-dandruff shampoos, such as Head & Shoulders, Selsun Blue, and Neutrogena T-gel, every month.  The risks, benefits, and side effects of this medication were discussed.  The patient expressed understanding and is in agreement with this plan.    ketoconazole (NIZOral) 2 % shampoo - Mid Frontal Scalp  Wash affected areas of scalp 2-3 times weekly as directed    9. History of nonmelanoma skin cancer (3)  Left Lower  Back, Left Nasal Sidewall, Right Lower Back  On the patient's left lateral nasal dorsum, right lower back, and left medial mid back, there are well-healed scars with no evidence of recurrent growth on exam today.    History of basal cell carcinomas and actinic keratoses and photodamage.  There is no evidence of recurrence at the sites of the patient's previously treated BCCs on exam today.  The signs and symptoms of skin cancer were reviewed and the patient was advised to practice sun protection and sun avoidance, use daily sunscreen, and perform regular self skin exams.  I will have the patient return to our office in 6-12 months, pending the above biopsy result, for routine follow-up and skin exam, and the patient was instructed to call our office should the patient notice any new, changing, symptomatic, or otherwise concerning skin lesions before then.  The patient expressed understanding and is in agreement with this plan.    10. Diffuse photodamage of skin  Photodistributed  Diffuse photodamage with actinic changes with telangiectasia and mottled pigmentation in sun-exposed areas.    Photodamage.  The signs and symptoms of skin cancer were reviewed and the patient was advised to practice sun protection and sun avoidance, use daily sunscreen, and perform regular self skin exams.  Sun protection was discussed, including avoiding the mid-day sun, wearing a sunscreen with SPF at least 50, and stressing the need for reapplication of sunscreen and applying more than they think they need.

## 2025-02-12 ENCOUNTER — APPOINTMENT (OUTPATIENT)
Dept: RADIOLOGY | Facility: CLINIC | Age: 75
End: 2025-02-12
Payer: MEDICARE

## 2025-02-12 ENCOUNTER — HOSPITAL ENCOUNTER (OUTPATIENT)
Dept: RADIOLOGY | Facility: CLINIC | Age: 75
End: 2025-02-12
Payer: MEDICARE

## 2025-02-14 ENCOUNTER — HOSPITAL ENCOUNTER (OUTPATIENT)
Dept: RADIOLOGY | Facility: CLINIC | Age: 75
End: 2025-02-14
Payer: MEDICARE

## 2025-02-14 DIAGNOSIS — Z12.31 VISIT FOR SCREENING MAMMOGRAM: Primary | ICD-10-CM

## 2025-02-14 DIAGNOSIS — Z12.39 BREAST SCREENING: Primary | ICD-10-CM

## 2025-02-19 ENCOUNTER — HOSPITAL ENCOUNTER (OUTPATIENT)
Dept: RADIOLOGY | Facility: CLINIC | Age: 75
Discharge: HOME | End: 2025-02-19
Payer: MEDICARE

## 2025-02-19 DIAGNOSIS — Z12.31 VISIT FOR SCREENING MAMMOGRAM: ICD-10-CM

## 2025-02-19 PROCEDURE — 77067 SCR MAMMO BI INCL CAD: CPT

## 2025-02-19 PROCEDURE — 77067 SCR MAMMO BI INCL CAD: CPT | Performed by: RADIOLOGY

## 2025-02-19 PROCEDURE — 77063 BREAST TOMOSYNTHESIS BI: CPT | Performed by: RADIOLOGY

## 2025-03-14 ENCOUNTER — OFFICE VISIT (OUTPATIENT)
Dept: URGENT CARE | Age: 75
End: 2025-03-14
Payer: MEDICARE

## 2025-03-14 VITALS
SYSTOLIC BLOOD PRESSURE: 137 MMHG | OXYGEN SATURATION: 98 % | BODY MASS INDEX: 23.83 KG/M2 | DIASTOLIC BLOOD PRESSURE: 69 MMHG | WEIGHT: 143.2 LBS | HEART RATE: 91 BPM

## 2025-03-14 DIAGNOSIS — Z20.822 SUSPECTED COVID-19 VIRUS INFECTION: ICD-10-CM

## 2025-03-14 DIAGNOSIS — B34.9 VIRAL ILLNESS: Primary | ICD-10-CM

## 2025-03-14 LAB
POC RAPID INFLUENZA A: NEGATIVE
POC RAPID INFLUENZA B: NEGATIVE
POC SARS-COV-2 AG BINAX: NORMAL

## 2025-03-14 NOTE — PROGRESS NOTES
Subjective   Patient ID: Taryn Brar is a 74 y.o. female. They present today with a chief complaint of Illness (Nausea, bodyaches, headaches, loss of appetite, x2days  ).    History of Present Illness  74-year-old female coming in with complaints of nausea, body aches, headaches, and loss of appetite.  She states this has been going on for since yesterday.  She denies any abdominal pain or diarrhea.  She denies any vomiting.  She states that she does feel fatigued.  She denies any other complaints today.    Past Medical History  Allergies as of 03/14/2025 - Reviewed 03/14/2025   Allergen Reaction Noted    Norvasc [amlodipine] Dizziness 11/08/2024       (Not in a hospital admission)       Past Medical History:   Diagnosis Date    Abnormal electrocardiogram (ECG) (EKG) 10/08/2018    Abnormal EKG    Acute upper respiratory infection, unspecified 08/27/2019    Viral URI    Age-related osteoporosis without current pathological fracture 01/11/2018    Osteoporosis    Encounter for general adult medical examination without abnormal findings 11/18/2020    Medicare annual wellness visit, subsequent    Encounter for other preprocedural examination 10/08/2018    Pre-op evaluation    Hypertrophy of breast 10/08/2018    Macromastia    Personal history of other diseases of the respiratory system 02/11/2020    History of acute sinusitis       Past Surgical History:   Procedure Laterality Date    APPENDECTOMY  07/01/2016    Appendectomy    BREAST SURGERY Bilateral     Breast Reduction    INCISIONAL BREAST BIOPSY Left 2001    Incisional Breast Biopsy benign    REDUCTION MAMMAPLASTY  2016    TUBAL LIGATION  07/01/2016    Tubal Ligation        reports that she has never smoked. She has never used smokeless tobacco. She reports that she does not drink alcohol and does not use drugs.    Review of Systems  Review of Systems:  General: No weight loss, positive fatigue, no anorexia, insomnia, fever, chills.  ENT: No pharyngitis, dry mouth,  nasal congestion, ear pain  Cardiac: No chest pain, palpitations, syncope, near syncope.  Pulmonary:  No shortness of breath, cough, hemoptysis  Heme/lymph: No swollen glands, fever, bleeding  GI: No abdominal pain, change in bowel habits, melena, hematemesis, hematochezia, positive nausea, no vomiting, or diarrhea  : No discharge, dysuria, frequency, urgency, hematuria  Musculoskeletal: No limb pain, joint pain, joint swelling. Positive body aches  Skin: No rashes  Neuro: No numbness, tingling, positive headaches                                 Objective    Vitals:    03/14/25 0858   BP: 137/69   Pulse: 91   SpO2: 98%   Weight: 65 kg (143 lb 3.2 oz)     No LMP recorded. Patient is postmenopausal.    Physical Exam  Physical Exam:  General: Vital noted, no distress. Afebrile  EENT: Eyes unremarkable, Pupils PERRLA, EOMs intact. TMs unremarkable. Posterior oropharynx unremarkable. Uvula in the midline and non-edematous. No PTA. No retropharyngeal mass. No Natalio's angina.  Cardiac: Regular rate and rhythm, no murmur  Pulmonary: Lungs clear bilaterally with good aeration. No adventitious breath sounds.  Musculoskeletal: No peripheral edema.   Skin: No rashes      Procedures    Point of Care Test & Imaging Results from this visit  Results for orders placed or performed in visit on 03/14/25   POCT Covid-19 Rapid Antigen   Result Value Ref Range    POC JONATHON-COV-2 AG  Presumptive negative test for SARS-CoV-2 (no antigen detected)     Presumptive negative test for SARS-CoV-2 (no antigen detected)   POCT Influenza A/B manually resulted   Result Value Ref Range    POC Rapid Influenza A Negative Negative    POC Rapid Influenza B Negative Negative      No results found.    Diagnostic study results (if any) were reviewed by ALEXIS Del Real.    Assessment/Plan   Allergies, medications, history, and pertinent labs/EKGs/Imaging reviewed by ALEXIS Del Real.     Medical Decision Making  Testing: rapid covid and  flu  Treatment: Advised increased fluids and rest.  Differential: 1) viral illness, 2) covid, 3) flu  Plan: Patient will follow up with the PCP in the next 2-3 days. Return for any worsening symptoms or go to the ER for further evaluation. Patient understands return precautions and discharge insturctions.  Impression:   1) viral illness      Orders and Diagnoses  Diagnoses and all orders for this visit:  Viral illness  Suspected COVID-19 virus infection  -     POCT Covid-19 Rapid Antigen  -     POCT Influenza A/B manually resulted      Medical Admin Record      Patient disposition: Home    Electronically signed by ALEXIS Del Real  9:18 AM

## 2025-05-06 ENCOUNTER — OFFICE VISIT (OUTPATIENT)
Dept: URGENT CARE | Age: 75
End: 2025-05-06
Payer: MEDICARE

## 2025-05-06 ENCOUNTER — ANCILLARY PROCEDURE (OUTPATIENT)
Dept: URGENT CARE | Age: 75
End: 2025-05-06
Payer: MEDICARE

## 2025-05-06 VITALS
TEMPERATURE: 95.6 F | SYSTOLIC BLOOD PRESSURE: 140 MMHG | DIASTOLIC BLOOD PRESSURE: 74 MMHG | OXYGEN SATURATION: 98 % | HEART RATE: 86 BPM

## 2025-05-06 DIAGNOSIS — K59.00 CONSTIPATION, UNSPECIFIED CONSTIPATION TYPE: Primary | ICD-10-CM

## 2025-05-06 DIAGNOSIS — K59.00 CONSTIPATION, UNSPECIFIED CONSTIPATION TYPE: ICD-10-CM

## 2025-05-06 PROCEDURE — 3077F SYST BP >= 140 MM HG: CPT | Performed by: NURSE PRACTITIONER

## 2025-05-06 PROCEDURE — 3078F DIAST BP <80 MM HG: CPT | Performed by: NURSE PRACTITIONER

## 2025-05-06 PROCEDURE — 99214 OFFICE O/P EST MOD 30 MIN: CPT | Performed by: NURSE PRACTITIONER

## 2025-05-06 PROCEDURE — 74018 RADEX ABDOMEN 1 VIEW: CPT | Performed by: NURSE PRACTITIONER

## 2025-05-06 NOTE — PROGRESS NOTES
Subjective   Patient ID: Taryn Brar is a 75 y.o. female. They present today with a chief complaint of Abdominal Pain (Pt stated she has bad cramps and hasn't been able to go to the bathroom yet today. Started last night. ) and Constipation.    History of Present Illness  76 yo female coming in for lower abdominal cramping and constipation. She states last night she felt bloated as well and when she was trying to go to the bathroom the stool was stringy. She states today she has not been able to have a bowel movement at all. She states she did take a stool softener yesterday and something for bloating which did seem to help. She denies any abdominal pain today. She denies any nausea or vomiting. She denies any other complaints today.     Past Medical History  Allergies as of 05/06/2025 - Reviewed 03/14/2025   Allergen Reaction Noted    Norvasc [amlodipine] Dizziness 11/08/2024       Prescriptions Prior to Admission[1]     Medical History[2]    Surgical History[3]     reports that she has never smoked. She has never used smokeless tobacco. She reports that she does not drink alcohol and does not use drugs.    Review of Systems  Review of Systems:  General: No weight loss, fatigue, anorexia, insomnia, fever, chills.  Cardiac: No chest pain, palpitations, syncope, near syncope.  Pulmonary:  No shortness of breath, cough, hemoptysis  Heme/lymph: No swollen glands, fever, bleeding  GI: Positive intermittent abdominal cramping, no change in bowel habits, melena, hematemesis, hematochezia, nausea, vomiting, or diarrhea. Positive constipation  : No discharge, dysuria, frequency, urgency, hematuria                                 Objective    Vitals:    05/06/25 1123   BP: 140/74   BP Location: Right arm   Patient Position: Sitting   Pulse: 86   Temp: 35.3 °C (95.6 °F)   TempSrc: Oral   SpO2: 98%     No LMP recorded. Patient is postmenopausal.    Physical Exam  Physical Exam:  General: Vital noted, no distress.  Afebrile  Cardiac: Regular rate and rhythm, no murmur  Pulmonary: Lungs clear bilaterally with good aeration. No adventitious breath sounds.  Abdomen: Soft, nontender, nonsurgical. No peritoneal signs. Normoactive bowel sounds.   Musculoskeletal: No peripheral edema.   Skin: No rashes    Procedures    Point of Care Test & Imaging Results from this visit  No results found for this visit on 05/06/25.   Imaging  No results found.    Cardiology, Vascular, and Other Imaging  No other imaging results found for the past 2 days      Diagnostic study results (if any) were reviewed by ALEXIS Del Real.    Assessment/Plan   Allergies, medications, history, and pertinent labs/EKGs/Imaging reviewed by ALEXIS Del Real.     Medical Decision Making  Testing: KUB  Treatment: Instructed patient to increase fiber in her diet and to increase water intake  Differential: 1) constipation , 2) abdominal bloating , 3) uti  Plan: Patient will follow up with the PCP as needed. Return for any worsening symptoms or go to the ER for further evaluation. Patient understands return precautions and discharge insturctions.  Impression:   1) constipation      Orders and Diagnoses  Diagnoses and all orders for this visit:  Constipation, unspecified constipation type  -     XR abdomen 1 view; Future      Medical Admin Record      Patient disposition: Home    Electronically signed by ALEXIS Del Real  12:21 PM           [1] (Not in a hospital admission)   [2]   Past Medical History:  Diagnosis Date    Abnormal electrocardiogram (ECG) (EKG) 10/08/2018    Abnormal EKG    Acute upper respiratory infection, unspecified 08/27/2019    Viral URI    Age-related osteoporosis without current pathological fracture 01/11/2018    Osteoporosis    Encounter for general adult medical examination without abnormal findings 11/18/2020    Medicare annual wellness visit, subsequent    Encounter for other preprocedural examination 10/08/2018     Pre-op evaluation    Hypertrophy of breast 10/08/2018    Macromastia    Personal history of other diseases of the respiratory system 02/11/2020    History of acute sinusitis   [3]   Past Surgical History:  Procedure Laterality Date    APPENDECTOMY  07/01/2016    Appendectomy    BREAST SURGERY Bilateral     Breast Reduction    INCISIONAL BREAST BIOPSY Left 2001    Incisional Breast Biopsy benign    REDUCTION MAMMAPLASTY  2016    TUBAL LIGATION  07/01/2016    Tubal Ligation

## 2025-05-06 NOTE — LETTER
May 6, 2025     Patient: Taryn Brar   YOB: 1950   Date of Visit: 5/6/2025       To Whom It May Concern:    It is my medical opinion that Taryn Brar may return to work on 5/7/25.    If you have any questions or concerns, please don't hesitate to call.         Sincerely,        Jennifer Ríos, MI-CNP    CC: No Recipients

## 2025-05-14 ENCOUNTER — APPOINTMENT (OUTPATIENT)
Dept: PRIMARY CARE | Facility: CLINIC | Age: 75
End: 2025-05-14
Payer: MEDICARE

## 2025-05-21 ENCOUNTER — APPOINTMENT (OUTPATIENT)
Dept: PRIMARY CARE | Facility: CLINIC | Age: 75
End: 2025-05-21
Payer: MEDICARE

## 2025-06-07 ENCOUNTER — OFFICE VISIT (OUTPATIENT)
Dept: URGENT CARE | Age: 75
End: 2025-06-07
Payer: MEDICARE

## 2025-06-07 VITALS
OXYGEN SATURATION: 98 % | WEIGHT: 143 LBS | DIASTOLIC BLOOD PRESSURE: 74 MMHG | TEMPERATURE: 98 F | HEART RATE: 93 BPM | RESPIRATION RATE: 16 BRPM | SYSTOLIC BLOOD PRESSURE: 138 MMHG | BODY MASS INDEX: 23.8 KG/M2

## 2025-06-07 DIAGNOSIS — R10.30 LOWER ABDOMINAL PAIN: ICD-10-CM

## 2025-06-07 DIAGNOSIS — R31.9 HEMATURIA, UNSPECIFIED TYPE: Primary | ICD-10-CM

## 2025-06-07 DIAGNOSIS — R82.90 ABNORMAL FINDING ON URINALYSIS: ICD-10-CM

## 2025-06-07 DIAGNOSIS — R14.0 BLOATING: ICD-10-CM

## 2025-06-07 LAB
POC APPEARANCE, URINE: CLEAR
POC BILIRUBIN, URINE: NEGATIVE
POC BLOOD, URINE: ABNORMAL
POC COLOR, URINE: YELLOW
POC GLUCOSE, URINE: NEGATIVE MG/DL
POC KETONES, URINE: NEGATIVE MG/DL
POC LEUKOCYTES, URINE: ABNORMAL
POC NITRITE,URINE: NEGATIVE
POC PH, URINE: 6 PH
POC PROTEIN, URINE: NEGATIVE MG/DL
POC SPECIFIC GRAVITY, URINE: <=1.005
POC UROBILINOGEN, URINE: 1 EU/DL

## 2025-06-07 RX ORDER — CEPHALEXIN 500 MG/1
500 CAPSULE ORAL 2 TIMES DAILY
Qty: 14 CAPSULE | Refills: 0 | Status: SHIPPED | OUTPATIENT
Start: 2025-06-07 | End: 2025-06-14

## 2025-06-07 ASSESSMENT — PATIENT HEALTH QUESTIONNAIRE - PHQ9
SUM OF ALL RESPONSES TO PHQ9 QUESTIONS 1 AND 2: 0
1. LITTLE INTEREST OR PLEASURE IN DOING THINGS: NOT AT ALL
2. FEELING DOWN, DEPRESSED OR HOPELESS: NOT AT ALL

## 2025-06-07 ASSESSMENT — ENCOUNTER SYMPTOMS
LOSS OF SENSATION IN FEET: 0
OCCASIONAL FEELINGS OF UNSTEADINESS: 0
DEPRESSION: 0

## 2025-06-07 NOTE — PROGRESS NOTES
Subjective   Patient ID: Taryn Brar is a 75 y.o. female. They present today with a chief complaint of suprapubic abdominal pain and bloating since yesterday.  Last bowel movement was yesterday morning it was per her usual.  She is able to flatulence.  She took Dulcolax yesterday without relief to the abdominal pain or the feeling of being bloated.  She denies chest pain, shortness of breath, palpitation, fever, chills, changes in urinary habits.    Past Medical History  Allergies as of 06/07/2025 - Reviewed 06/07/2025   Allergen Reaction Noted    Norvasc [amlodipine] Dizziness 11/08/2024       Prescriptions Prior to Admission[1]     Medical History[2]    Surgical History[3]     reports that she has never smoked. She has never used smokeless tobacco. She reports that she does not drink alcohol and does not use drugs.    Review of Systems  Review of Systems                               Objective    Vitals:    06/07/25 1022   BP: 138/74   Pulse: 93   Resp: 16   Temp: 36.7 °C (98 °F)   TempSrc: Oral   SpO2: 98%   Weight: 64.9 kg (143 lb)     No LMP recorded. Patient is postmenopausal.    Physical Exam  Vitals reviewed.   Constitutional:       General: She is not in acute distress.  HENT:      Head: Normocephalic and atraumatic.      Nose: Nose normal.      Mouth/Throat:      Mouth: Mucous membranes are moist.   Eyes:      Extraocular Movements: Extraocular movements intact.      Conjunctiva/sclera: Conjunctivae normal.   Cardiovascular:      Rate and Rhythm: Normal rate and regular rhythm.      Heart sounds: No murmur heard.  Pulmonary:      Effort: Pulmonary effort is normal.      Breath sounds: Normal breath sounds. No decreased breath sounds, wheezing, rhonchi or rales.   Abdominal:      General: Bowel sounds are normal.      Palpations: Abdomen is soft.      Tenderness: There is abdominal tenderness in the suprapubic area. There is no right CVA tenderness or left CVA tenderness.   Skin:     General: Skin is warm.    Neurological:      Mental Status: She is alert and oriented to person, place, and time.   Psychiatric:         Mood and Affect: Mood normal.         Behavior: Behavior normal.             Point of Care Test & Imaging Results from this visit  Results for orders placed or performed in visit on 06/07/25   POCT UA Automated manually resulted   Result Value Ref Range    POC Color, Urine Yellow Straw, Yellow, Light-Yellow    POC Appearance, Urine Clear Clear    POC Glucose, Urine NEGATIVE NEGATIVE mg/dl    POC Bilirubin, Urine NEGATIVE NEGATIVE    POC Ketones, Urine NEGATIVE NEGATIVE mg/dl    POC Specific Gravity, Urine <=1.005 1.005 - 1.035    POC Blood, Urine SMALL (1+) (A) NEGATIVE    POC PH, Urine 6.0 No Reference Range Established PH    POC Protein, Urine NEGATIVE NEGATIVE mg/dl    POC Urobilinogen, Urine 1.0 0.2, 1.0 EU/DL    Poc Nitrite, Urine NEGATIVE NEGATIVE    POC Leukocytes, Urine TRACE (A) NEGATIVE      Imaging  No results found.    Cardiology, Vascular, and Other Imaging  No other imaging results found for the past 2 days      Diagnostic study results (if any) were reviewed by Felix Moore PA-C.    Assessment/Plan   Allergies, medications, history, and pertinent labs/EKGs/Imaging reviewed by Felix Moore PA-C.     Medical Decision Making  UA is positive.  Urine culture sent out.  Patient will start on Keflex.  Advised patient to take MiraLAX as needed for constipation.  -         Patient is educated about their diagnoses.     -          Discussed medications benefits and adverse effects.     -          Answered all patient’s questions.     -          Patient will call 911 or go to the nearest ED if worsen symptoms .     -          Patient is agreeable to the plan of care and is deemed stable upon discharge.     -          Follow up with your primary care provider in two days.    Orders and Diagnoses  Diagnoses and all orders for this visit:  Hematuria, unspecified type  -     Urine Culture  -      cephalexin (Keflex) 500 mg capsule; Take 1 capsule (500 mg) by mouth 2 times a day for 7 days.  Lower abdominal pain  -     POCT UA Automated manually resulted  -     Urine Culture  -     cephalexin (Keflex) 500 mg capsule; Take 1 capsule (500 mg) by mouth 2 times a day for 7 days.  Abnormal finding on urinalysis  -     Urine Culture  -     cephalexin (Keflex) 500 mg capsule; Take 1 capsule (500 mg) by mouth 2 times a day for 7 days.      Medical Admin Record      Patient disposition: Home    Electronically signed by Felix Moore PA-C  11:09 AM           [1] (Not in a hospital admission)   [2]   Past Medical History:  Diagnosis Date    Abnormal electrocardiogram (ECG) (EKG) 10/08/2018    Abnormal EKG    Acute upper respiratory infection, unspecified 08/27/2019    Viral URI    Age-related osteoporosis without current pathological fracture 01/11/2018    Osteoporosis    Encounter for general adult medical examination without abnormal findings 11/18/2020    Medicare annual wellness visit, subsequent    Encounter for other preprocedural examination 10/08/2018    Pre-op evaluation    Hypertrophy of breast 10/08/2018    Macromastia    Personal history of other diseases of the respiratory system 02/11/2020    History of acute sinusitis   [3]   Past Surgical History:  Procedure Laterality Date    APPENDECTOMY  07/01/2016    Appendectomy    BREAST SURGERY Bilateral     Breast Reduction    INCISIONAL BREAST BIOPSY Left 2001    Incisional Breast Biopsy benign    REDUCTION MAMMAPLASTY  2016    TUBAL LIGATION  07/01/2016    Tubal Ligation

## 2025-06-09 LAB — BACTERIA UR CULT: NORMAL

## 2025-06-23 ENCOUNTER — APPOINTMENT (OUTPATIENT)
Dept: PRIMARY CARE | Facility: CLINIC | Age: 75
End: 2025-06-23
Payer: MEDICARE

## 2025-06-23 VITALS
BODY MASS INDEX: 24.59 KG/M2 | DIASTOLIC BLOOD PRESSURE: 76 MMHG | TEMPERATURE: 98.9 F | OXYGEN SATURATION: 95 % | HEART RATE: 86 BPM | WEIGHT: 144 LBS | SYSTOLIC BLOOD PRESSURE: 124 MMHG | HEIGHT: 64 IN

## 2025-06-23 DIAGNOSIS — M81.0 OSTEOPOROSIS WITHOUT CURRENT PATHOLOGICAL FRACTURE, UNSPECIFIED OSTEOPOROSIS TYPE: ICD-10-CM

## 2025-06-23 DIAGNOSIS — E78.5 HYPERLIPIDEMIA, UNSPECIFIED HYPERLIPIDEMIA TYPE: ICD-10-CM

## 2025-06-23 DIAGNOSIS — I10 BENIGN HYPERTENSION: Primary | ICD-10-CM

## 2025-06-23 PROBLEM — C44.91 BASAL CELL CARCINOMA (BCC): Status: ACTIVE | Noted: 2024-04-25

## 2025-06-23 PROBLEM — N62 MACROMASTIA: Status: RESOLVED | Noted: 2023-04-05 | Resolved: 2025-06-23

## 2025-06-23 PROBLEM — L71.9 ROSACEA, UNSPECIFIED: Status: ACTIVE | Noted: 2023-08-04

## 2025-06-23 PROCEDURE — 1036F TOBACCO NON-USER: CPT | Performed by: PHYSICIAN ASSISTANT

## 2025-06-23 PROCEDURE — 3074F SYST BP LT 130 MM HG: CPT | Performed by: PHYSICIAN ASSISTANT

## 2025-06-23 PROCEDURE — 1159F MED LIST DOCD IN RCRD: CPT | Performed by: PHYSICIAN ASSISTANT

## 2025-06-23 PROCEDURE — 1160F RVW MEDS BY RX/DR IN RCRD: CPT | Performed by: PHYSICIAN ASSISTANT

## 2025-06-23 PROCEDURE — G2211 COMPLEX E/M VISIT ADD ON: HCPCS | Performed by: PHYSICIAN ASSISTANT

## 2025-06-23 PROCEDURE — 99214 OFFICE O/P EST MOD 30 MIN: CPT | Performed by: PHYSICIAN ASSISTANT

## 2025-06-23 PROCEDURE — 3078F DIAST BP <80 MM HG: CPT | Performed by: PHYSICIAN ASSISTANT

## 2025-06-23 PROCEDURE — 3008F BODY MASS INDEX DOCD: CPT | Performed by: PHYSICIAN ASSISTANT

## 2025-06-23 RX ORDER — DICLOFENAC SODIUM 75 MG/1
75 TABLET, DELAYED RELEASE ORAL DAILY
COMMUNITY

## 2025-06-23 RX ORDER — OLMESARTAN MEDOXOMIL 5 MG/1
5 TABLET, FILM COATED ORAL DAILY
Qty: 90 TABLET | Refills: 1 | Status: SHIPPED | OUTPATIENT
Start: 2025-06-23 | End: 2026-06-23

## 2025-06-23 ASSESSMENT — ENCOUNTER SYMPTOMS
FEVER: 0
HEADACHES: 0
SHORTNESS OF BREATH: 0

## 2025-06-23 ASSESSMENT — PATIENT HEALTH QUESTIONNAIRE - PHQ9
SUM OF ALL RESPONSES TO PHQ9 QUESTIONS 1 AND 2: 0
2. FEELING DOWN, DEPRESSED OR HOPELESS: NOT AT ALL
1. LITTLE INTEREST OR PLEASURE IN DOING THINGS: NOT AT ALL

## 2025-06-23 NOTE — PROGRESS NOTES
Subjective   Patient ID: Taryn Brar is a 75 y.o. female who presents for New Patient Visit and Establish Care.    HPI   Patient presents to establish.     Hx of HTN.  Takes and tolerates olmesartan.  Does walking exercise. Still works at [employer] as a .     Hx of hyperlipidemia.   LDL Calculated (mg/dL)   Date Value   07/30/2024 130     LDL (mg/dL)   Date Value   05/04/2022 136 (H)   10/16/2017 153 (H)     Triglycerides (mg/dL)   Date Value   07/30/2024 174 (H)   05/04/2022 167 (H)   10/16/2017 143     HDL-Cholesterol (mg/dL)   Date Value   07/30/2024 53.7     HDL (mg/dL)   Date Value   05/04/2022 55.8   11/18/2020 50.2     Hx of allergic rhinitis.     Hx of osteoporosis.  DEXA in 2018 showed T-score of -2.7.  Takes Vit D  but not calcium.     Sees dermatologist Dr Nicole. Hx of skin cancer (basal cell) and rosacea.    Sees podiatrist Dr Perdomo.     Colonoscopy in 7/27/2022 with Dr Leland Bonilla --repeat in 2027. Hx of hyperplastic polyps.   Mammogram 2/19/25    She has seen uro/gyn Dr Jeffery Guerra due to some bladder issues (was sent by her gyn Dr Gonzalez.  Planning to go back soon.     Previous PCP was Dr Danilo Moore.  (Previous was Dr Banks).     Review of Systems   Constitutional:  Negative for fever.   Respiratory:  Negative for shortness of breath.    Cardiovascular:  Negative for chest pain.   Neurological:  Negative for headaches.       Patient Active Problem List   Diagnosis    Allergic rhinitis    Fracture of fifth metatarsal bone    Hyperlipidemia    Inversion sprain of right ankle    Osteoporosis    Nasal cavity mass    Rectocele, female    Urethral caruncle    Basal cell carcinoma (BCC) of skin of nose    Inflamed seborrheic keratosis    Melanocytic nevi of scalp and neck    Melanocytic nevi of trunk    Melanocytic nevi of unspecified lower limb, including hip    Benign hypertension    Chronic nonintractable headache    Rosacea, unspecified       Past Medical History:   Diagnosis Date  "   Abnormal electrocardiogram (ECG) (EKG) 10/08/2018    Abnormal EKG    Age-related osteoporosis without current pathological fracture 01/11/2018    Osteoporosis    Allergic     Basal cell carcinoma     Hypertrophy of breast 10/08/2018    Macromastia        Family History   Problem Relation Name Age of Onset    No Known Problems Mother      Pancreatic cancer Father Hay Allred     Cancer Father Hay Allred         Social History     Tobacco Use    Smoking status: Never    Smokeless tobacco: Never   Substance Use Topics    Alcohol use: Never    Drug use: Never        Objective   /76   Pulse 86   Temp 37.2 °C (98.9 °F)   Ht 1.626 m (5' 4\")   Wt 65.3 kg (144 lb)   SpO2 95%   BMI 24.72 kg/m²     Physical Exam  Vitals and nursing note reviewed.   Constitutional:       Appearance: Normal appearance. She is well-developed.   Eyes:      General: No scleral icterus.  Cardiovascular:      Rate and Rhythm: Normal rate and regular rhythm.   Pulmonary:      Effort: Pulmonary effort is normal.      Breath sounds: Normal breath sounds.   Abdominal:      Palpations: Abdomen is soft. There is no mass.      Tenderness: There is no abdominal tenderness.   Musculoskeletal:      Cervical back: Neck supple.   Skin:     General: Skin is warm and dry.   Neurological:      Mental Status: She is alert.         Assessment/Plan   Diagnoses and all orders for this visit:  Benign hypertension  -     olmesartan (Benicar) 5 mg tablet; Take 1 tablet (5 mg) by mouth once daily.  -     Comprehensive Metabolic Panel; Future  -     CBC and Auto Differential; Future  Hyperlipidemia, unspecified hyperlipidemia type  -     Comprehensive Metabolic Panel; Future  -     Lipid Panel; Future  Osteoporosis without current pathological fracture, unspecified osteoporosis type  -     XR DEXA bone density; Future  -     Comprehensive Metabolic Panel; Future  -     CBC and Auto Differential; Future  -     Vitamin D 25-Hydroxy,Total (for eval of Vitamin D " levels); Future  Other orders  -     Follow Up In Primary Care; Future       Discussed diet and exercise.   Reviewed previous labs.   Can take OTC fish oil.   Refilled olmesartan.   Ordered repeat DEXA scan.   Recheck of HTN, hyperlipidemia, and osteoporosis with [facility] in 6 months, with fasting labs the week before appt.

## 2025-06-24 ENCOUNTER — APPOINTMENT (OUTPATIENT)
Dept: GASTROENTEROLOGY | Facility: CLINIC | Age: 75
End: 2025-06-24
Payer: MEDICARE

## 2025-06-25 ENCOUNTER — HOSPITAL ENCOUNTER (OUTPATIENT)
Dept: RADIOLOGY | Facility: CLINIC | Age: 75
Discharge: HOME | End: 2025-06-25
Payer: MEDICARE

## 2025-06-25 DIAGNOSIS — M81.0 OSTEOPOROSIS WITHOUT CURRENT PATHOLOGICAL FRACTURE, UNSPECIFIED OSTEOPOROSIS TYPE: ICD-10-CM

## 2025-06-25 PROCEDURE — 77080 DXA BONE DENSITY AXIAL: CPT

## 2025-06-25 PROCEDURE — 77080 DXA BONE DENSITY AXIAL: CPT | Performed by: RADIOLOGY

## 2025-07-02 ENCOUNTER — TELEPHONE (OUTPATIENT)
Dept: DERMATOLOGY | Facility: CLINIC | Age: 75
End: 2025-07-02
Payer: MEDICARE

## 2025-07-02 NOTE — TELEPHONE ENCOUNTER
Pt left VM that she needed rf of her Metronidazole 0.75% cr -- she thought because she didn't have any more rf that Dr Nicole wanted her to stop using ?? I returned call to pt left message that Dr Nicole gave her 11 rf on her Jan 2025 appt , however one of her providers deleted it from her med list , I told pt yes she does have to continue using cr and called pharmacy to re-do with 6 rfs until her follow up appt in Jan 2026

## 2025-07-09 ENCOUNTER — APPOINTMENT (OUTPATIENT)
Dept: DERMATOLOGY | Facility: CLINIC | Age: 75
End: 2025-07-09
Payer: MEDICARE

## 2025-08-01 ENCOUNTER — APPOINTMENT (OUTPATIENT)
Dept: OBSTETRICS AND GYNECOLOGY | Facility: CLINIC | Age: 75
End: 2025-08-01
Payer: MEDICARE

## 2025-08-23 ENCOUNTER — OFFICE VISIT (OUTPATIENT)
Dept: URGENT CARE | Age: 75
End: 2025-08-23
Payer: MEDICARE

## 2025-08-23 VITALS
OXYGEN SATURATION: 98 % | SYSTOLIC BLOOD PRESSURE: 146 MMHG | HEART RATE: 72 BPM | DIASTOLIC BLOOD PRESSURE: 75 MMHG | RESPIRATION RATE: 20 BRPM | TEMPERATURE: 97.4 F

## 2025-08-23 DIAGNOSIS — J06.9 VIRAL UPPER RESPIRATORY TRACT INFECTION: Primary | ICD-10-CM

## 2025-08-23 PROCEDURE — 99213 OFFICE O/P EST LOW 20 MIN: CPT | Performed by: PHYSICIAN ASSISTANT

## 2025-08-23 PROCEDURE — 3078F DIAST BP <80 MM HG: CPT | Performed by: PHYSICIAN ASSISTANT

## 2025-08-23 PROCEDURE — 3077F SYST BP >= 140 MM HG: CPT | Performed by: PHYSICIAN ASSISTANT

## 2025-08-23 PROCEDURE — 1036F TOBACCO NON-USER: CPT | Performed by: PHYSICIAN ASSISTANT

## 2025-08-23 PROCEDURE — 1159F MED LIST DOCD IN RCRD: CPT | Performed by: PHYSICIAN ASSISTANT

## 2025-08-23 RX ORDER — AZITHROMYCIN 250 MG/1
TABLET, FILM COATED ORAL
Qty: 6 TABLET | Refills: 0 | Status: SHIPPED | OUTPATIENT
Start: 2025-08-23

## 2025-08-23 RX ORDER — FLUTICASONE PROPIONATE 50 MCG
1 SPRAY, SUSPENSION (ML) NASAL
Qty: 16 G | Refills: 0 | Status: SHIPPED | OUTPATIENT
Start: 2025-08-23 | End: 2026-08-23

## 2025-08-23 RX ORDER — ACETAMINOPHEN AND CHLORPHENIRAMINE MALEATE 325; 2 MG/1; MG/1
1 TABLET, FILM COATED ORAL EVERY 6 HOURS
Qty: 28 TABLET | Refills: 0 | Status: SHIPPED | OUTPATIENT
Start: 2025-08-23 | End: 2025-08-30

## 2025-08-23 ASSESSMENT — PATIENT HEALTH QUESTIONNAIRE - PHQ9
2. FEELING DOWN, DEPRESSED OR HOPELESS: NOT AT ALL
1. LITTLE INTEREST OR PLEASURE IN DOING THINGS: NOT AT ALL
SUM OF ALL RESPONSES TO PHQ9 QUESTIONS 1 AND 2: 0

## 2025-08-23 ASSESSMENT — ENCOUNTER SYMPTOMS: HEADACHES: 1

## 2025-09-18 ENCOUNTER — APPOINTMENT (OUTPATIENT)
Dept: UROLOGY | Facility: CLINIC | Age: 75
End: 2025-09-18
Payer: MEDICARE

## 2025-12-23 ENCOUNTER — APPOINTMENT (OUTPATIENT)
Dept: PRIMARY CARE | Facility: CLINIC | Age: 75
End: 2025-12-23
Payer: MEDICARE

## 2026-01-14 ENCOUNTER — APPOINTMENT (OUTPATIENT)
Dept: DERMATOLOGY | Facility: CLINIC | Age: 76
End: 2026-01-14
Payer: MEDICARE